# Patient Record
Sex: FEMALE | Race: ASIAN | NOT HISPANIC OR LATINO | Employment: UNEMPLOYED | URBAN - METROPOLITAN AREA
[De-identification: names, ages, dates, MRNs, and addresses within clinical notes are randomized per-mention and may not be internally consistent; named-entity substitution may affect disease eponyms.]

---

## 2020-06-12 ENCOUNTER — APPOINTMENT (OUTPATIENT)
Dept: RADIOLOGY | Facility: MEDICAL CENTER | Age: 60
End: 2020-06-12
Attending: EMERGENCY MEDICINE
Payer: COMMERCIAL

## 2020-06-12 ENCOUNTER — HOSPITAL ENCOUNTER (EMERGENCY)
Facility: MEDICAL CENTER | Age: 60
End: 2020-06-12
Attending: EMERGENCY MEDICINE
Payer: COMMERCIAL

## 2020-06-12 VITALS
SYSTOLIC BLOOD PRESSURE: 108 MMHG | BODY MASS INDEX: 19.41 KG/M2 | HEIGHT: 68 IN | RESPIRATION RATE: 18 BRPM | DIASTOLIC BLOOD PRESSURE: 66 MMHG | TEMPERATURE: 97.9 F | HEART RATE: 70 BPM | WEIGHT: 128.09 LBS | OXYGEN SATURATION: 98 %

## 2020-06-12 DIAGNOSIS — S32.050A CLOSED COMPRESSION FRACTURE OF L5 LUMBAR VERTEBRA, INITIAL ENCOUNTER (HCC): ICD-10-CM

## 2020-06-12 PROCEDURE — 72131 CT LUMBAR SPINE W/O DYE: CPT

## 2020-06-12 PROCEDURE — 99283 EMERGENCY DEPT VISIT LOW MDM: CPT

## 2020-06-12 RX ORDER — IBUPROFEN 200 MG
400 TABLET ORAL EVERY 6 HOURS PRN
Status: SHIPPED | COMMUNITY
End: 2021-11-09

## 2020-06-12 RX ORDER — NAPROXEN SODIUM 220 MG
440 TABLET ORAL 2 TIMES DAILY WITH MEALS
Status: SHIPPED | COMMUNITY
End: 2021-11-09

## 2020-06-12 RX ORDER — HYDROCODONE BITARTRATE AND ACETAMINOPHEN 5; 325 MG/1; MG/1
1 TABLET ORAL EVERY 4 HOURS PRN
Qty: 12 TAB | Refills: 0 | Status: SHIPPED | OUTPATIENT
Start: 2020-06-12 | End: 2020-06-15

## 2020-06-12 NOTE — ED TRIAGE NOTES
Pt to ed c/o glf June 1  Injury at work , seen at [facility]  C/o l-spine pain , tingling to both legs at times

## 2020-06-12 NOTE — LETTER
"  FORM C-4:  EMPLOYEE’S CLAIM FOR COMPENSATION/ REPORT OF INITIAL TREATMENT  EMPLOYEE’S CLAIM - PROVIDE ALL INFORMATION REQUESTED   First Name Elsa Last Name Jamarcus Birthdate 1960  Sex female Claim Number   Home Employee Address [address]                                     Zip  [address] Height  1.727 m (5' 8\") Weight  58.1 kg (128 lb 1.4 oz) N  [id number]   Mailing Employee Address [address]               Zip  [address] Telephone  [phone] (home)  Primary Language Spoken   Insurer  *** Third Party   RUBIN CLAIMS [employer] Employee's Occupation (Job Title) When Injury or Occupational Disease Occurred     Employer's Name  Telephone     Employer Address  City  State  Zip    Date of Injury  6/1/2020       Hour of Injury  9:10 AM Date Employer Notified  6/1/2020 Last Day of Work after Injury or Occupational Disease  6/11/2020 Supervisor to Whom Injury Reported  Liudmila Wise   Address or Location of Accident (if applicable) [[address] [place] [address] [address] loading dock]   What were you doing at the time of accident? 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 Be specific and answer in detail. Use additional sheet if necessary)  Received [employer] delivery at 9AM (68 boxes) Re-loading onto cart stepped backwards and fell backwards 3 steps - concrete (no railing) then to asphalt ground. Back Lumber region hit and tumbled and landed on right side.   If you believe that you have an occupational disease, when did you first have knowledge of the disability and it relationship to your employment? When I fell at work Witnesses to the Accident  none   Nature of Injury or Occupational Disease  Workers' Compensation Part(s) of Body Injured or Affected  Lumbar and/or Sacral Vertebrae (Vertebrae NOC Trunk), Elbow (R), Soft Tissue - Neck    I CERTIFY THAT THE ABOVE IS TRUE AND " CORRECT TO THE BEST OF MY KNOWLEDGE AND THAT I HAVE PROVIDED THIS INFORMATION IN ORDER TO OBTAIN THE B[place]EF[place] OF [place] CHAPTER 617 OF [place]).  I HEREBY AUTHORIZE ANY PHYSICIAN, CHIROPRACTOR, SURGEON, PRACTITIONER, OR OTHER PERSON, ANY HOSPITAL, INCLUDING [facility] OR [facility], ANY MEDICAL SERVICE ORGANIZATION, ANY INSURANCE COMPANY, OR OTHER INSTITUTION OR ORGANIZATION TO RELEASE TO EACH OTHER, ANY MEDICAL OR OTHER INFORMATION, INCLUDING BENEFITS PAID OR PAYABLE, PERTINENT TO THIS INJURY OR DISEASE, EXCEPT INFORMATION RELATIVE TO DIAGNOSIS, TREATMENT AND/OR COUNSELING FOR AIDS, PSYCHOLOGICAL CONDITIONS, ALCOHOL OR CONTROLLED SUBSTANCES, FOR WHICH I MUST GIVE SPECIFIC AUTHORIZATION.  A PHOTOSTAT OF THIS AUTHORIZATION SHALL BE AS VALID AS THE ORIGINAL.  Date                                      Place                                                                             Employee’s Signature   THIS REPORT MUST BE COMPLETED AND MAILED WITHIN 3 WORKING DAYS OF TREATMENT   Place [facility], EMERGENCY DEPT                                                                             Name of Facility [facility]   Date  6/12/2020 Diagnosis  (S32.050A) Closed compression fracture of L5 lumbar vertebra, initial encounter ([facility]) Is there evidence the injured employee was under the influence of alcohol and/or another controlled substance at the time of accident?   Hour  9:49 AM Description of Injury or Disease  Closed compression fracture of L5 lumbar vertebra, initial encounter ([facility])     Treatment     Have you advised the patient to remain off work five days or more?             X-Ray Findings    If Yes   From Date    To Date      From information given by the employee, together with medical evidence, can you directly connect this injury or occupational disease as job  "incurred?   If No, is employee capable of: Full Duty    Modified Duty      Is additional medical care by a physician indicated?   If Modified Duty, Specify any Limitations / Restrictions       Do you know of any previous injury or disease contributing to this condition or occupational disease?      Date 6/12/2020 Print Doctor’s Name Sangita Dinero I certify the employer’s copy of this form was mailed on:   Address [address] [place] [id number] [phone] INSURER’S USE ONLY   Provider’s Tax ID Number   Telephone Dept: [phone]    Doctor’s Signature   Degree        Form C-4 (rev.10/07)                                                                         BRIEF DESCRIPTION OF RIGHTS AND BENEFITS  (Pursuant to [place]RS 616C.050)    Notice of Injury or Occupational Disease (Incident Report Form C-1): If an injury or occupational disease (OD) arises out of and in the course of employment, you must provide written notice to your employer as soon as practicable, but no later than 7 days after the accident or OD. Your employer shall maintain a sufficient supply of the required forms.    Claim for Compensation (Form C-4): If medical treatment is sought, the form C-4 is available at the place of initial treatment. A completed \"Claim for Compensation\" (Form C-4) must be filed within 90 days after an accident or OD. The treating physician or chiropractor must, within 3 working days after treatment, complete and mail to the employer, the employer's insurer and third-party , the Claim for Compensation.    Medical Treatment: If you require medical treatment for your on-the-job injury or OD, you may be required to select a physician or chiropractor from a list provided by your workers’ compensation insurer, if it has contracted with an Organization for Managed Care (MCO) or Preferred Provider Organization (PPO) or providers of health care. If your employer has not entered into a contract with an MCO " or PPO, you may select a physician or chiropractor from the Panel of Physicians and Chiropractors. Any medical costs related to your industrial injury or OD will be paid by your insurer.    Temporary Total Disability (TTD): If your doctor has certified that you are unable to work for a period of at least 5 consecutive days, or 5 cumulative days in a 20-day period, or places restrictions on you that your employer does not accommodate, you may be entitled to TTD compensation.    Temporary Partial Disability (TPD): If the wage you receive upon reemployment is less than the compensation for TTD to which you are entitled, the insurer may be required to pay you TPD compensation to make up the difference. TPD can only be paid for a maximum of 24 months.    Permanent Partial Disability (PPD): When your medical condition is stable and there is an indication of a PPD as a result of your injury or OD, within 30 days, your insurer must arrange for an evaluation by a rating physician or chiropractor to determine the degree of your PPD. The amount of your PPD award depends on the date of injury, the results of the PPD evaluation and your age and wage.    Permanent Total Disability (PTD): If you are medically certified by a treating physician or chiropractor as permanently and totally disabled and have been granted a PTD status by your insurer, you are entitled to receive monthly benefits not to exceed 66 2/3% of your average monthly wage. The amount of your PTD payments is subject to reduction if you previously received a PPD award.    Vocational Rehabilitation Services: You may be eligible for vocational rehabilitation services if you are unable to return to the job due to a permanent physical impairment or permanent restrictions as a result of your injury or occupational disease.    Transportation and Per Sasha Reimbursement: You may be eligible for travel expenses and per sasha associated with medical treatment.    Reopening:  You may be able to reopen your claim if your condition worsens after claim closure.     Appeal Process: If you disagree with a written determination issued by the insurer or the insurer does not respond to your request, you may appeal to the Department of Administration, , by following the instructions contained in your determination letter. You must appeal the determination within 70 days from the date of the determination letter at [address], [address], [place], [place] [address], or [address], [address], [place], [place] [address]. If you disagree with the  decision, you may appeal to the Department of Administration, . You must file your appeal within 30 days from the date of the  decision letter at [address], [address], [place], [place] [address], or [address], [place], [place] [address]. If you disagree with a decision of an , you may file a petition for judicial review with the District Court. You must do so within 30 days of the Appeal Officer’s decision. You may be represented by an  at your own expense or you may contact the [facility] for possible representation.    [place]  for Injured Workers (NAIW): If you disagree with a  decision, you may request that NAIW represent you without charge at an  Hearing. For information regarding denial of benefits, you may contact the [facility] at: [address], [address], [place] [address], [phone], or [address], [place] [address], [phone]    To File a Complaint with the Division: If you wish to file a complaint with the  of the Division of Industrial Relations (DIR),  please contact the Workers’ Compensation Section, [address], [address], [place], [place] [address], telephone (705) [phone], or [address] [address], [facility]  [place], [place] [address], telephone [phone].    For assistance with Workers’ Compensation Issues: You may contact the Office of the Governor Consumer Health Assistance, [address] [place] [address], [address], [place], [place] [address], Toll Free 1-222.797.3616, Web site: http://govMercy Health Tiffin Hospital.[email or web address].[email or web address]., E-mail [email or web address].  D-2 (rev. 06/18)              __________________________________________________________________                                    _________________            Employee Name / Signature                                                                                                                            Date

## 2020-06-12 NOTE — LETTER
"  FORM C-4:  EMPLOYEE’S CLAIM FOR COMPENSATION/ REPORT OF INITIAL TREATMENT  EMPLOYEE’S CLAIM - PROVIDE ALL INFORMATION REQUESTED   First Name Elsa Last Name Jamarcus Birthdate 1960  Sex female Claim Number   Home Employee Address [address]                                     Zip  [address] Height  1.727 m (5' 8\") Weight  58.1 kg (128 lb 1.4 oz) [facility]     Mailing Employee Address [address]               Zip  [address] Telephone  [phone] (home)  Primary Language Spoken  English   Insurer  [employer] Third Party   RUBIN CLAIMS [employer] Employee's Occupation (Job Title) When Injury or Occupational Disease Occurred     Employer's Name [employer] Telephone [phone]    Employer Address [address] [place] [address]   Date of Injury  6/1/2020       Hour of Injury  9:10 AM Date Employer Notified  6/1/2020 Last Day of Work after Injury or Occupational Disease  6/11/2020 Supervisor to Whom Injury Reported  Liudmila Wise   Address or Location of Accident (if applicable) [[address]]   What were you doing at the time of accident? 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 Be specific and answer in detail. Use additional sheet if necessary)  Received [employer] delivery at 9AM (68 boxes) Re-loading onto cart stepped backwards and fell backwards 3 steps - concrete (no railing) then to asphalt ground. Back [place] region hit and tumbled and landed on right side.   If you believe that you have an occupational disease, when did you first have knowledge of the disability and it relationship to your employment? When I fell at work Witnesses to the Accident  none   Nature of Injury or Occupational Disease  Workers' Compensation Part(s) of Body Injured or Affected  Lumbar and/or Sacral " Vertebrae (Vertebrae NOC Trunk), Elbow (R), Soft Tissue - Neck    I CERTIFY THAT THE ABOVE IS TRUE AND CORRECT TO THE BEST OF MY KNOWLEDGE AND THAT I HAVE PROVIDED THIS INFORMATION IN ORDER TO OBTAIN THE B[place]EF[place], [place] CHAPTER 617 OF [place]).  I HEREBY AUTHORIZE ANY PHYSICIAN, CHIROPRACTOR, SURGEON, PRACTITIONER, OR OTHER PERSON, ANY HOSPITAL, INCLUDING [facility] OR [facility], ANY MEDICAL SERVICE ORGANIZATION, ANY INSURANCE COMPANY, OR OTHER INSTITUTION OR ORGANIZATION TO RELEASE TO EACH OTHER, ANY MEDICAL OR OTHER INFORMATION, INCLUDING BENEFITS PAID OR PAYABLE, PERTINENT TO THIS INJURY OR DISEASE, EXCEPT INFORMATION RELATIVE TO DIAGNOSIS, TREATMENT AND/OR COUNSELING FOR AIDS, PSYCHOLOGICAL CONDITIONS, ALCOHOL OR CONTROLLED SUBSTANCES, FOR WHICH I MUST GIVE SPECIFIC AUTHORIZATION.  A PHOTOSTAT OF THIS AUTHORIZATION SHALL BE AS VALID AS THE ORIGINAL.  Date  06/12/2020           Place [facility]                     Employee’s Signature   THIS REPORT MUST BE COMPLETED AND MAILED WITHIN 3 WORKING DAYS OF TREATMENT   Place [facility], EMERGENCY DEPT                                                                             Name of Facility [facility]   Date  6/12/2020 Diagnosis  (S32.050A) Closed compression fracture of L5 lumbar vertebra, initial encounter ([facility]) Is there evidence the injured employee was under the influence of alcohol and/or another controlled substance at the time of accident?   Hour  9:59 AM Description of Injury or Disease  Closed compression fracture of L5 lumbar vertebra, initial encounter ([facility]) Yes   Treatment  CT  Have you advised the patient to remain off work five days or more?         Yes   X-Ray Findings  Positive If Yes   From Date  6/12/2020 To Date  6/26/2020   From information given by the employee,  "together with medical evidence, can you directly connect this injury or occupational disease as job incurred? Yes If No, is employee capable of: Full Duty  No Modified Duty  No   Is additional medical care by a physician indicated? Yes If Modified Duty, Specify any Limitations / Restrictions       Do you know of any previous injury or disease contributing to this condition or occupational disease? No    Date 6/12/2020 Print Doctor’s Name Pat Dinero I certify the employer’s copy of this form was mailed on:   [place] [address] [place] [id number] [phone] INSURER’S USE ONLY   Provider’s Tax ID Number  [id number] Telephone Dept: [phone]    Doctor’s Signature e-PAT Sanchez M.D. Degree  MD      Form C-4 (rev.10/07)                                                                         BRIEF DESCRIPTION OF RIGHTS AND BENEFITS  ([place]ursuant to [place] 616C.050)    Notice of Injury or Occupational Disease (Incident Report Form C-1): If an injury or occupational disease (OD) arises out of and in the course of employment, you must provide written notice to your employer as soon as practicable, but no later than 7 days after the accident or OD. Your employer shall maintain a sufficient supply of the required forms.    Claim for Compensation (Form C-4): If medical treatment is sought, the form C-4 is available at the place of initial treatment. A completed \"Claim for Compensation\" (Form C-4) must be filed within 90 days after an accident or OD. The treating physician or chiropractor must, within 3 working days after treatment, complete and mail to the employer, the employer's insurer and third-party , the Claim for Compensation.    Medical Treatment: If you require medical treatment for your on-the-job injury or OD, you may be required to select a physician or chiropractor from a list provided by your workers’ compensation insurer, if it has contracted with an Organization for " Managed Care (MCO) or Preferred Provider Organization (PPO) or providers of health care. If your employer has not entered into a contract with an MCO or PPO, you may select a physician or chiropractor from the Panel of Physicians and Chiropractors. Any medical costs related to your industrial injury or OD will be paid by your insurer.    Temporary Total Disability (TTD): If your doctor has certified that you are unable to work for a period of at least 5 consecutive days, or 5 cumulative days in a 20-day period, or places restrictions on you that your employer does not accommodate, you may be entitled to TTD compensation.    Temporary Partial Disability (TPD): If the wage you receive upon reemployment is less than the compensation for TTD to which you are entitled, the insurer may be required to pay you TPD compensation to make up the difference. TPD can only be paid for a maximum of 24 months.    Permanent Partial Disability (PPD): When your medical condition is stable and there is an indication of a PPD as a result of your injury or OD, within 30 days, your insurer must arrange for an evaluation by a rating physician or chiropractor to determine the degree of your PPD. The amount of your PPD award depends on the date of injury, the results of the PPD evaluation and your age and wage.    Permanent Total Disability (PTD): If you are medically certified by a treating physician or chiropractor as permanently and totally disabled and have been granted a PTD status by your insurer, you are entitled to receive monthly benefits not to exceed 66 2/3% of your average monthly wage. The amount of your PTD payments is subject to reduction if you previously received a PPD award.    Vocational Rehabilitation Services: You may be eligible for vocational rehabilitation services if you are unable to return to the job due to a permanent physical impairment or permanent restrictions as a result of your injury or occupational  disease.    Transportation and Per Sasha Reimbursement: You may be eligible for travel expenses and per sasha associated with medical treatment.    Reopening: You may be able to reopen your claim if your condition worsens after claim closure.     Appeal Process: If you disagree with a written determination issued by the insurer or the insurer does not respond to your request, you may appeal to the Department of Administration, , by following the instructions contained in your determination letter. You must appeal the determination within 70 days from the date of the determination letter at [address], [address], [place], [place] [address], or [address], [address], [place], [place] [address]. If you disagree with the  decision, you may appeal to the Department of Administration, . You must file your appeal within 30 days from the date of the  decision letter at [address], [address], [place], [place] [address], or [address], [place], [place] [address]. If you disagree with a decision of an , you may file a petition for judicial review with the District Court. You must do so within 30 days of the Appeal Officer’s decision. You may be represented by an  at your own expense or you may contact the [facility] for possible representation.    [place]  for In[place] Workers ([place]IW): If you disagree with a  decision, you may request that NAIW represent you without charge at an  Hearing. For information regarding denial of benefits, you may contact the [facility] at: [address], [address], [place] [address], [phone], or [address] [facility] [address], [place] [address], [phone]    To File a Complaint with the Division: If you wish to file a complaint with the  of the Division of Industrial Relations (DIR),  please contact the  Workers’ Compensation Section, [address], [address], [place], [place] [address], telephone (944) 682-3380, or [address], [address], [place], [place] [address], telephone [phone].    For assistance with Workers’ Compensation Issues: You may contact the Office of the Governor Consumer Health Assistance, [address] [place] [address], [address], [place], [place] [address], Toll Free 1-763.334.7579, Web site: http://govUniversity Hospitals TriPoint Medical Center.[facility].[email or web address]., E-mail [email or web address]  D-2 (rev. 06/18)              __________________________________________________________________                                    ____06/12/2020______            Employee Name / Signature                                                                                                                            Date

## 2020-06-12 NOTE — ED NOTES
D/c pt home, 1 rx given . Pt aware of f/u instructions , aware to return for any changes or concerns. No further questions upon d/c home from ed  Pt given and understands controlled substance use consent form signed  Pt aware to no drive or operate vehicle after receiving or taking medication

## 2020-06-12 NOTE — LETTER
"  FORM C-4:  EMPLOYEE’S CLAIM FOR COMPENSATION/ REPORT OF INITIAL TREATMENT  EMPLOYEE’S CLAIM - PROVIDE ALL INFORMATION REQUESTED   First Name Elsa Last Name Jamarcus Birthdate 1960  Sex female Claim Number   Home Employee Address [address]                                     Zip  [address] Height  1.727 m (5' 8\") Weight  58.1 kg (128 lb 1.4 oz) [employer] [facility]     Mailing Employee Address [address]               Zip  [address] Telephone  [phone] (home)  Primary Language Spoken   Insurer   Third Party   RUBIN CLAIMS [employer] Employee's Occupation (Job Title) When Injury or Occupational Disease Occurred     Employer's Name: [employer] Telephone: [phone]    Employer Address: [address] [place] [address] [address] City: [place] Stat:  [place] Zip:  [address]   Date of Injury  6/1/2020       Hour of Injury  9:10 AM Date Employer Notified  6/1/2020 Last Day of Work after Injury or Occupational Disease  6/11/2020 Supervisor to Whom Injury Reported  Liudmila Wise   Address or Location of Accident (if applicable) [[address] [place] [address] [address] loading dock]   What were you doing at the time of accident? 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 Be specific and answer in detail. Use additional sheet if necessary)  Received [employer] delivery at 9AM (68 boxes) Re-loading onto cart stepped backwards and fell backwards 3 steps - concrete (no railing) then to asphalt ground. Back Lumber region hit and tumbled and landed on right side. MGR & Asst. MGR picked me up from the ground.   If you believe that you have an occupational disease, when did you first have knowledge of the disability and it relationship to your employment? When I fell at work Witnesses to the Accident  My Boss Liudmila Wise & Asst MGRItzel saw me on the ground & picked me up.   Nature of Injury or " Occupational Disease  Workers' Compensation Part(s) of Body Injured or Affected  Lumbar and/or Sacral Vertebrae (Vertebrae NOC Trunk), Elbow (R), Soft Tissue - Neck    I CERTIFY THAT THE ABOVE IS TRUE AND CORRECT TO THE BEST OF MY KNOWLEDGE AND THAT I HAVE PROVIDED THIS INFORMATION IN ORDER TO OBTAIN THE BENEFITS OF [place] IN[place] OR CHAPTER [place]).  I HEREBY AUTHORIZE ANY PHYSICIAN, CHIROPRACTOR, SURGEON, PRACTITIONER, OR OTHER PERSON, ANY HOSPITAL, INCLUDING [facility] OR [facility], ANY MEDICAL SERVICE ORGANIZATION, ANY INSURANCE COMPANY, OR OTHER INSTITUTION OR ORGANIZATION TO RELEASE TO EACH OTHER, ANY MEDICAL OR OTHER INFORMATION, INCLUDING BENEFITS PAID OR PAYABLE, PERTINENT TO THIS INJURY OR DISEASE, EXCEPT INFORMATION RELATIVE TO DIAGNOSIS, TREATMENT AND/OR COUNSELING FOR AIDS, PSYCHOLOGICAL CONDITIONS, ALCOHOL OR CONTROLLED SUBSTANCES, FOR WHICH I MUST GIVE SPECIFIC AUTHORIZATION.  A PHOTOSTAT OF THIS AUTHORIZATION SHALL BE AS VALID AS THE ORIGINAL.  Date: 06/12/2020                                Place: [facility]                Employee’s Signature:   THIS REPORT MUST BE COMPLETED AND MAILED WITHIN 3 WORKING DAYS OF TREATMENT   Place [facility], EMERGENCY DEPT                                                                             Name of Facility [facility]   Date  6/12/2020 Diagnosis  (S32.050A) Closed compression fracture of L5 lumbar vertebra, initial encounter ([facility]) Is there evidence the injured employee was under the influence of alcohol and/or another controlled substance at the time of accident?   Hour  11:50 AM Description of Injury or Disease  Closed compression fracture of L5 lumbar vertebra, initial encounter ([facility]) No   Treatment  CT  Have you advised the patient to remain off work five days or more?         Yes   X-Ray  "Findings  Positive If Yes   From Date  6/12/2020 To Date  6/26/2020   From information given by the employee, together with medical evidence, can you directly connect this injury or occupational disease as job incurred? Yes If No, is employee capable of: Full Duty  No Modified Duty  No   Is additional medical care by a physician indicated? Yes If Modified Duty, Specify any Limitations / Restrictions       Do you know of any previous injury or disease contributing to this condition or occupational disease? No    Date 6/23/2020 Print Doctor’s Name Pat Dinero I certify the employer’s copy of this form was mailed on:   Address [address] [address]  [phone] INSURER’S USE ONLY   Provider’s Tax ID Number [id number] Telephone Dept: [phone]    Doctor’s Signature e-PAT Sanchez M.D. Degree MD      Form C-4 (rev.10/07)                                                                         BRIEF DESCRIPTION OF RIGHTS AND BENEFITS  (Pursuant to [place] 616C.050)    Notice of Injury or Occupational Disease (Incident Report Form C-1): If an injury or occupational disease (OD) arises out of and in the course of employment, you must provide written notice to your employer as soon as practicable, but no later than 7 days after the accident or OD. Your employer shall maintain a sufficient supply of the required forms.    Claim for Compensation (Form C-4): If medical treatment is sought, the form C-4 is available at the place of initial treatment. A completed \"Claim for Compensation\" (Form C-4) must be filed within 90 days after an accident or OD. The treating physician or chiropractor must, within 3 working days after treatment, complete and mail to the employer, the employer's insurer and third-party , the Claim for Compensation.    Medical Treatment: If you require medical treatment for your on-the-job injury or OD, you may be required to select a physician or chiropractor " from a list provided by your workers’ compensation insurer, if it has contracted with an Organization for Managed Care (MCO) or Preferred Provider Organization (PPO) or providers of health care. If your employer has not entered into a contract with an MCO or PPO, you may select a physician or chiropractor from the Panel of Physicians and Chiropractors. Any medical costs related to your industrial injury or OD will be paid by your insurer.    Temporary Total Disability (TTD): If your doctor has certified that you are unable to work for a period of at least 5 consecutive days, or 5 cumulative days in a 20-day period, or places restrictions on you that your employer does not accommodate, you may be entitled to TTD compensation.    Temporary Partial Disability (TPD): If the wage you receive upon reemployment is less than the compensation for TTD to which you are entitled, the insurer may be required to pay you TPD compensation to make up the difference. TPD can only be paid for a maximum of 24 months.    Permanent Partial Disability (PPD): When your medical condition is stable and there is an indication of a PPD as a result of your injury or OD, within 30 days, your insurer must arrange for an evaluation by a rating physician or chiropractor to determine the degree of your PPD. The amount of your PPD award depends on the date of injury, the results of the PPD evaluation and your age and wage.    Permanent Total Disability (PTD): If you are medically certified by a treating physician or chiropractor as permanently and totally disabled and have been granted a PTD status by your insurer, you are entitled to receive monthly benefits not to exceed 66 2/3% of your average monthly wage. The amount of your PTD payments is subject to reduction if you previously received a PPD award.    Vocational Rehabilitation Services: You may be eligible for vocational rehabilitation services if you are unable to return to the job due to a  permanent physical impairment or permanent restrictions as a result of your injury or occupational disease.    Transportation and Per Sasha Reimbursement: You may be eligible for travel expenses and per sasha associated with medical treatment.    Reopening: You may be able to reopen your claim if your condition worsens after claim closure.     Appeal Process: If you disagree with a written determination issued by the insurer or the insurer does not respond to your request, you may appeal to the Department of Administration, , by following the instructions contained in your determination letter. You must appeal the determination within 70 days from the date of the determination letter at [address], [address], [place], [place] [address], or [address], [address], [place] [address]. If you disagree with the  decision, you may appeal to the Department of Administration, . You must file your appeal within 30 days from the date of the  decision letter at [address], [address], [place], [place] [address], or [address] [facility] [address], [place] [address]. If you disagree with a decision of an , you may file a petition for judicial review with the District Court. You must do so within 30 days of the Appeal Officer’s decision. You may be represented by an  at your own expense or you may contact the [facility] for possible representation.    [place]  for Injured Workers (NAIW): If you disagree with a  decision, you may request that NAIW represent you without charge at an  Hearing. For information regarding denial of benefits, you may contact the [facility] at: [address], [address], [place] [address], [phone], or [address], [place] [address], [phone]    To File a Complaint with the Division: If you wish to file a  complaint with the  of the Division of Industrial Relations (DIR),  please contact the Workers’ Compensation Section, [address], [address], [place], [place] [address], telephone [phone], or [address], [place], [place] [address], telephone [phone].    For assistance with Workers’ Compensation Issues: You may contact the Office of the Governor Consumer Health Assistance, [address] [place] [address], [address], [place], [place] [address], Toll Free 1-100.610.3659, Web site: [email or web address]., E-mail [email or web address].  D-2 (rev. 06/18)              __________________________________________________________________                                    _________________            Employee Name / Signature                                                                                                                            Date

## 2020-06-12 NOTE — ED PROVIDER NOTES
"ED Provider Note    CHIEF COMPLAINT  Chief Complaint   Patient presents with   • Low Back Pain       HPI  Elsa Ricketts is a 59 y.o. female who presents to the emergency department with a chief complaint of back pain after a fall at work.  She was initially seen by Bryan.  She was placed on anti-inflammatories and muscle relaxants.  She did have x-rays at that time with no noted fracture.  The patient states that they did not take her seriously or listen to her so she came here for a second opinion.  She states her back feels like it gave out on her.  She is able to ambulate she is had no loss of bowel or bladder function.  She is starting to have some associated neck discomfort.  She has some tingling is going into both of her legs, no numbness weakness or inability to ambulate    REVIEW OF SYSTEMS  Patient denies history of cancer, fever, trauma, numbness, weakness, loss of bowel or bladder function. See HPI for further details. All other systems are negative.     PAST MEDICAL HISTORY    Denies    FAMILY HISTORY  No family history on file.    SOCIAL HISTORY  Social History     Tobacco Use   • Smoking status: Not on file   Substance and Sexual Activity   • Alcohol use: Not on file   • Drug use: Not on file   • Sexual activity: Not on file       SURGICAL HISTORY  patient denies any surgical history    CURRENT MEDICATIONS  Ibuprofen, robaxin  ALLERGIES  denies  PHYSICAL EXAM  VITAL SIGNS: /70   Pulse 69   Temp 36.6 °C (97.9 °F) (Temporal)   Resp 16   Ht 1.727 m (5' 8\")   Wt 58.1 kg (128 lb 1.4 oz)   SpO2 97%   BMI 19.48 kg/m²   Constitutional: Well developed, Well nourished, No acute distress, Non-toxic appearance.   HENT: No facial asymmetry, no midline cervical tenderness.  Cardiovascular: Normal heart rate  Thorax & Lungs:  No respiratory distress  Skin: Warm, Dry, No erythema, No rash.   Back:  no tenderness to palpation, no step-offs, no gross deformity, negative straight leg raise bilaterally, no " fasciculations, no patellar reflex asymmetry, no clonus.  Extremities: Intact distal pulses, No edema, No tenderness, No cyanosis, No clubbing. Healed scar with bony deformity noted right foot  Neurologic: Alert & oriented x 3, Normal motor function, Normal sensory function, No focal deficits noted.         RADIOLOGY/PROCEDURES  CT-LSPINE W/O PLUS RECONS   Final Result      Acute-subacute L1 mild anterosuperior endplate compression fracture with no central canal compromise identified              COURSE & MEDICAL DECISION MAKING  Pertinent Labs & Imaging studies reviewed. (See chart for details)    Differential diagnosis includes musculoskeletal back pain, acute radiculopathy, no fever or dysuria to suggest pyelonephritis,  acute cord compression, there is no unexplained fever and back pain or IV drug use to suggest an epidural abscess, there is no neurologic deficit or decreased bowel and bladder control or saddle anesthesia to suggest cauda equina syndrome.     Patient with complaint of back pain, persistent, CT to ensure no bony abn.  Patient was noted to have a minor compression fracture at 1 of 5%.  She will be referred to [facility] that she does not want to go back to [facility] as well as orthopedics as we do not have spine on-call here at [facility], Dr. Gay is on-call.    Emergent MRI is not currently indicated, pt is in understanding to return if they develop weakness, saddle paresthesias, severe worsening of pain, bowel or bladder incontinence, fever, inability to ambulate or they have any other concerns.    DISPOSITION:  Patient will be discharged home in stable condition.    FOLLOW UP:  [address]  [facility] [id number] [phone]        Abel Gay M.D.  [address] [facility] [address]  [phone]            OUTPATIENT MEDICATIONS:  New Prescriptions    HYDROCODONE-ACETAMINOPHEN (NORCO) 5-325 MG TAB PER TABLET    Take  1 Tab by mouth every four hours as needed for up to 3 days.         In prescribing controlled substances to this patient, I certify that I have obtained and reviewed the medical history of Elsalinda Ricketts. I have also made a good kim effort to obtain applicable records from other providers who have treated the patient and no other records are available at this time.     I have conducted a physical exam and documented it. I have reviewed Ms. Ricketts’s prescription history as maintained by the [place] Prescription Monitoring Program.     I have assessed the patient’s risk for abuse, dependency, and addiction using the validated Opioid Risk Tool available at https://www.mdcalc.com/ajwinh-bvcj-tmcv-ort-narcotic-abuse.     Given the above, I believe the benefits of controlled substance therapy outweigh the risks. The reasons for prescribing controlled substances include non-narcotic, oral analgesic alternatives are contraindicated. Accordingly, I have discussed the risk and benefits, treatment plan, and alternative therapies with the patient.         FINAL IMPRESSION  1.  L1 compression fracture        Electronically signed by: Sangita Dinero M.D., 6/12/2020 8:40 AM

## 2020-06-22 NOTE — DISCHARGE PLANNING
Pt called stating the return to work date is different from what she believes Dr Dinero told her. She has not followed up with Occupational Health and is trying to get in to see Dr Jackson. She states the office said he needs to review the records prior to scheduling an appt.    Pt states she has her AVS and didn't see that she was to follow up with [facility]. She was instructed to follow up with [facility] as they can determine if she is ready to return to work.

## 2020-06-24 ENCOUNTER — OCCUPATIONAL MEDICINE (OUTPATIENT)
Dept: OCCUPATIONAL MEDICINE | Facility: CLINIC | Age: 60
End: 2020-06-24
Payer: COMMERCIAL

## 2020-06-24 VITALS — HEIGHT: 68 IN | BODY MASS INDEX: 19.7 KG/M2 | WEIGHT: 130 LBS | RESPIRATION RATE: 16 BRPM

## 2020-06-24 DIAGNOSIS — M54.17 LUMBOSACRAL RADICULOPATHY: ICD-10-CM

## 2020-06-24 DIAGNOSIS — S32.050D CLOSED COMPRESSION FRACTURE OF L5 LUMBAR VERTEBRA, WITH ROUTINE HEALING, SUBSEQUENT ENCOUNTER: ICD-10-CM

## 2020-06-24 PROCEDURE — 99213 OFFICE O/P EST LOW 20 MIN: CPT | Performed by: NURSE PRACTITIONER

## 2020-06-24 ASSESSMENT — ENCOUNTER SYMPTOMS
CONSTITUTIONAL NEGATIVE: 1
FEVER: 0
CARDIOVASCULAR NEGATIVE: 1
TINGLING: 1
BACK PAIN: 1
RESPIRATORY NEGATIVE: 1
SENSORY CHANGE: 1
WEAKNESS: 1
MYALGIAS: 1
PSYCHIATRIC NEGATIVE: 1

## 2020-06-24 ASSESSMENT — PAIN SCALES - GENERAL: PAINLEVEL: 7=MODERATE-SEVERE PAIN

## 2020-06-24 NOTE — LETTER
"   [facility]  [address] [place] [address],   [address] [address]  Phone:  [phone] - Fax:  [phone]   Occupational Health [facility] Progress Report and Disability Certification  Date of Service: 6/24/2020   No Show:  No  Date / Time of Next Visit:     Claim Information   Patient Name: Elsa Ricketts  Claim Number:     Employer:    [employer] Date of Injury:      Insurer / TPA: [employer]  ID / SSN:     Occupation:   Diagnosis: The encounter diagnosis was Closed compression fracture of L5 lumbar vertebra, with routine healing, subsequent encounter.    Medical Information   Related to Industrial Injury? Yes    Subjective Complaints:  DOI 6/1/20: Putting boxes on a loading cart.    Today feels worse. Pain low back radiates down and up the back to the neck, intermittent numbness that radiates bilaterally, more intermittent on the left, then the right,\"legs feel weak and like mush\". Patient states while at home her back collapsed.  Patient denies bowel and bladder changes. Patient has taken hydrocodone, muscle relaxer, and Naproxen with moderate relief of symptoms. Patient using ice, heat, and topical ointment with mild relief. Patient has not not been back to work since the incident. Plan of care discussed with patient.    Objective Findings: Lumbar: No gross deformity noted.  Severe tenderness to paraspinal musculature L3-S1 and left SI joint.  Moderately decreased flexion or rotation.  Straight leg test, deferred at this visit. Unable to heel/toe walk. Cranial nerves grossly intact.  Achilles and patellar reflexes 3+ bilaterally, slightly diminished.   Sensation intact. Slow gait    CT scan 6/12/20:   ED  Discharged      6/12/2020 (2 hours)  [facility], Emergency Dept  Sangita Dinero M.D.   Last attending • Treatment team  Closed compression fracture of L5 lumbar vertebra, initial encounter ([facility])   Clinical impression  " " Low Back Pain   Chief complaint   ED Provider Notes     Expand All Collapse All      ED Provider Note     CHIEF COMPLAINT    Chief Complaint  Patient presents with  • Low Back Pain       HPI  Elsa Ricketts is a 59 y.o. female who presents to the emergency department with a chief complaint of back pain after a fall at work.  She was initially seen by Bryan.  She was placed on anti-inflammatories and muscle relaxants.  She did have x-rays at that time with no noted fracture.  The patient states that they did not take her seriously or listen to her so she came here for a second opinion.  She states her back feels like it gave out on her.  She is able to ambulate she is had no loss of bowel or bladder function.  She is starting to have some associated neck discomfort.  She has some tingling is going into both of her legs, no numbness weakness or inability to ambulate     REVIEW OF SYSTEMS  Patient denies history of cancer, fever, trauma, numbness, weakness, loss of bowel or bladder function. See HPI for further details. All other systems are negative.      PAST MEDICAL HISTORY    Denies     FAMILY HISTORY    Family History  No family history on file.       SOCIAL HISTORY    Social History       Tobacco Use  • Smoking status: Not on file  Substance and Sexual Activity  • Alcohol use: Not on file  • Drug use: Not on file  • Sexual activity: Not on file       SURGICAL HISTORY  patient denies any surgical history     CURRENT MEDICATIONS  Ibuprofen, robaxin  ALLERGIES  denies  PHYSICAL EXAM  VITAL SIGNS: /70   Pulse 69   Temp 36.6 °C (97.9 °F) (Temporal)   Resp 16   Ht 1.727 m (5' 8\")   Wt 58.1 kg (128 lb 1.4 oz)   SpO2 97%   BMI 19.48 kg/m²   Constitutional: Well developed, Well nourished, No acute distress, Non-toxic appearance.   HENT: No facial asymmetry, no midline cervical tenderness.  Cardiovascular: Normal heart rate  Thorax & Lungs:  No respiratory distress  Skin: Warm, Dry, No erythema, No rash. "   Back:  no tenderness to palpation, no step-offs, no gross deformity, negative straight leg raise bilaterally, no fasciculations, no patellar reflex asymmetry, no clonus.  Extremities: Intact distal pulses, No edema, No tenderness, No cyanosis, No clubbing. Healed scar with bony deformity noted right foot  Neurologic: Alert & oriented x 3, Normal motor function, Normal sensory function, No focal deficits noted.            RADIOLOGY/PROCEDURES    CT-LSPINE W/O PLUS RECONS  Final Result     Acute-subacute L1 mild anterosuperior endplate compression fracture with no central canal compromise identified                COURSE & MEDICAL DECISION MAKING  Pertinent Labs & Imaging studies reviewed. (See chart for details)     Differential diagnosis includes musculoskeletal back pain, acute radiculopathy, no fever or dysuria to suggest pyelonephritis,  acute cord compression, there is no unexplained fever and back pain or IV drug use to suggest an epidural abscess, there is no neurologic deficit or decreased bowel and bladder control or saddle anesthesia to suggest cauda equina syndrome.      Patient with complaint of back pain, persistent, CT to ensure no bony abn.  Patient was noted to have a minor compression fracture at 1 of 5%.  She will be referred to [facility] [employer] To Get Approved For [facility] that she does not want to go back to [facility] as well as orthopedics as we do not have spine on-call here at [facility], Dr. Gay is on-call.     Emergent MRI is not currently indicated, pt is in understanding to return if they develop weakness, saddle paresthesias, severe worsening of pain, bowel or bladder incontinence, fever, inability to ambulate or they have any other concerns.     DISPOSITION:  Patient will be discharged home in stable condition.     FOLLOW UP:  [address] [place] [address] [phone]           Abel Gay M.D.  [address] [facility] [place]  [address]  [phone]                OUTPATIENT MEDICATIONS:    New Prescriptions    HYDROCODONE-ACETAMINOPHEN (NORCO) 5-325 MG TAB PER TABLET    Take 1 Tab by mouth every four hours as needed for up to 3 days.          In prescribing controlled substances to this patient, I certify that I have obtained and reviewed the medical history of Elsa Ricketts. I have also made a good kim effort to obtain applicable records from other providers who have treated the patient and no other records are available at this time.      I have conducted a physical exam and documented it. I have reviewed Ms. Ricketts's prescription history as maintained by the [place] Prescription Monitoring Program.      I have assessed the patient's risk for abuse, dependency, and addiction using the validated Opioid Risk Tool available at https://www.mdcalc.com/stnadq-axrj-xgys-ort-narcotic-abuse.      Given the above, I believe the benefits of controlled substance therapy outweigh the risks. The reasons for prescribing controlled substances include non-narcotic, oral analgesic alternatives are contraindicated. Accordingly, I have discussed the risk and benefits, treatment plan, and alternative therapies with the patient.            FINAL IMPRESSION  1.  L1 compression fracture           Electronically signed by: Sangita Dinero M.D., 6/12/2020 8:40 AM       Other Notes   All notes      ED Notes from Elliot Nance R.N.      ED Notes from Elliot Nance R.N.      ED Notes from Jose G Parikh   Additional Orders and Documentation        Results  Imaging        Meds         Orders         Flowsheets      SmartForms:     EDR[facility]_C-4 PART 2     Encounter Info:    History,   Allergies,   Detailed Report     Communications        Summary of care document sent to Abel Gay M.D.   Sent 6/12/2020 by Sangita Dinero M.D.   Media     Discharge Instruction - Scan on 6/16/2020 8:49 PM   Consent Form - Controlled Substance -  Scan on 6/16/2020 8:49 PM   E-Consent - Pharmacy - Electronic signature on 6/12/2020 - E-signed   Clinical Impressions       Closed compression fracture of L5 lumbar vertebra, initial encounter ([facility])   Disposition        Discharge   Condition: Stable       AVS     1 Safe Discharge (Printed 6/12/2020)   2 Controlled Substance Use Informed Consent (Printed 6/12/2020)   3 ED After Visit Summary (Printed 6/12/2020)       Follow-Ups     [facility]   2 Follow up with Abel Gay M.D. (Orthopaedics)   3   Medication Changes        HYDROcodone-Acetaminophen 5-325 MG 1 Tab Oral EVERY 4 HOURS PRN     Medication List at Discharge    Care Timeline     0805    Arrived   0851    CT-LSPINE W/O PLUS RECONS   1018    Discharged        Pre-Existing Condition(s):     Assessment:   Condition Worsened    Status: Discharged / Care Transfer  Permanent Disability:No    Plan: Transfer [facility]ostics    Diagnostics: MRI    Comments:  Follow-up in 2 weeks, if not seen by Orthopedics  Restricted duty, Orthopedics  MRI ordered  Orthopedic referral placed, transfer care  Work restrictions  Continue with gentle range of motion and stretching as needed   Take muscle relaxer's and Norco   as prescribed DO NOT DRIVE or WORK while taking this medication  Continue rest, ice, heat, and topical ointments as needed     GO TO ER for immediate reevaluation for increased pain, numbness/weakness of lower extremities, or incontinence of bowel o  r bladder.      Disability Information   Status: Released to Restricted Duty    From:  6/24/2020  Through:   Restrictions are: Temporary   Physical Restrictions   Sitting:  < or = to 1 hr/day Standing:    Stooping:    Bending:      Squatting:    Walking:  < or = to 1 hr/day Climbing:    Pushing:  < or = to 1 hr/day   Pulling:  < or = to 1 hr/day Other:    Reaching Above Shoulder (L):   Reaching Above Shoulder (R):       Reaching Below Shoulder  (L):    Reaching Below Shoulder (R):      Not to exceed Weight Limits   Carrying(hrs):   Weight Limit(lb):   Comments:No lifting more than 5lbs Lifting(hrs):   Weight  Limit(lb):   Comments:No lifting more than 5lbs   Comments:      Repetitive Actions   Hands: i.e. Fine Manipulations from Grasping:     Feet: i.e. Operating Foot Controls:     Driving / Operate Machinery:     Physician Name: ELIZA Padilla Physician Signature:   e-Signature: Dr. Chuck Ruiz, Medical Director   Clinic Name / Location: [address],   [address]  [place] [address] Clinic Phone Number: Dept: [phone]   Appointment Time: 10:45 Am Visit Start Time: 10:45 AM   Check-In Time:  10:40 Am Visit Discharge Time: 11: 34 AM    Original-Treating Physician or Chiropractor    Page 2-Insurer/TPA    Page 3-Employer    Page 4-Employee

## 2020-06-24 NOTE — PROGRESS NOTES
"Subjective:      Elsa Ricketts is a 59 y.o. female who presents with Follow-Up ( DOI 06/01/2020 - Back , Neck , Elbow - )      DOI 6/1/20: Putting boxes on a loading cart.    Today feels worse. Pain low back radiates down and up the back to the neck, intermittent numbness that radiates bilaterally, more intermittent on the left, then the right,\"legs feel weak and like mush\". Patient states while at home her back collapsed.  Patient denies bowel and bladder changes. Patient has taken hydrocodone, muscle relaxer, and Naproxen with moderate relief of symptoms. Patient using ice, heat, and topical ointment with mild relief. Patient has not not been back to work since the incident. Plan of care discussed with patient.      HPI    Review of Systems   Constitutional: Negative.  Negative for fever and malaise/fatigue.   Respiratory: Negative.    Cardiovascular: Negative.    Musculoskeletal: Positive for back pain and myalgias.   Skin: Negative.    Neurological: Positive for tingling, sensory change and weakness.   Psychiatric/Behavioral: Negative.      ROS: All systems were reviewed on intake form, form was reviewed and signed. See scanned documents in media. Pertinent positives and negatives included in HPI.    PMH: No pertinent past medical history to this problem  MEDS: Medications were reviewed in Epic  ALLERGIES: No Known Allergies  SOCHX: Works as  at [employer]  FH: No pertinent family history to this problem       Objective:     Resp 16   Ht 1.727 m (5' 8\")   Wt 59 kg (130 lb)   BMI 19.77 kg/m²      Physical Exam  Constitutional:       General: She is in acute distress.      Appearance: Normal appearance. She is not ill-appearing.   Cardiovascular:      Rate and Rhythm: Normal rate and regular rhythm.      Pulses: Normal pulses.   Pulmonary:      Effort: Pulmonary effort is normal.   Musculoskeletal:         General: Tenderness and signs of injury present. No swelling or deformity. "   Skin:     General: Skin is warm and dry.      Capillary Refill: Capillary refill takes less than 2 seconds.      Findings: No bruising or erythema.   Neurological:      General: No focal deficit present.      Mental Status: She is alert and oriented to person, place, and time.      Cranial Nerves: No cranial nerve deficit.      Sensory: Sensory deficit present.      Motor: Weakness present.      Gait: Gait abnormal.      Deep Tendon Reflexes: Reflexes abnormal.   Psychiatric:         Mood and Affect: Mood normal.         Behavior: Behavior normal.         Lumbar: No gross deformity noted.  Severe tenderness to paraspinal musculature L3-S1 and left SI joint.  Moderately decreased flexion or rotation.  Straight leg test, deferred at this visit. Unable to heel/toe walk. Cranial nerves grossly intact.  Achilles and patellar reflexes 3+ bilaterally, slightly diminished.   Sensation intact. Slow gait    CT scan of Lumbar Spine  6/12/20:     IMPRESSION:     Acute-subacute L1 mild anterosuperior endplate compression fracture with no central canal compromise identified       Assessment/Plan:       1. Closed compression fracture of L5 lumbar vertebra, with routine healing, subsequent encounter    - MR-LUMBAR SPINE-W/O; Future  - REFERRAL TO RADIOLOGY  - REFERRAL TO ORTHOPEDICS    Follow-up in 2 weeks, if not seen by Orthopedics   Restricted duty, Orthopedics   MRI ordered   Orthopedic referral placed, transfer care   Work restrictions   Continue with gentle range of motion and stretching as needed   Take muscle relaxer's and Norco as prescribed DO NOT DRIVE or WORK while taking this medication   Continue rest, ice, heat, and topical ointments as needed     GO TO ER for immediate reevaluation for increased pain, numbness/weakness of lower extremities, or incontinence of bowel or bladder.

## 2020-07-06 ENCOUNTER — TELEPHONE (OUTPATIENT)
Dept: OCCUPATIONAL MEDICINE | Facility: CLINIC | Age: 60
End: 2020-07-06

## 2020-07-06 NOTE — TELEPHONE ENCOUNTER
Patient called multiple times demanding to be transferred to provider.  Return patient's call patient states that she was seen by [facility] last Wednesday or Thursday and her MRI is scheduled for tomorrow.  Patient states that she was going to avoid doing the MRI until her neck was added.  And strongly encourage patient not to cancel MRI of lumbar spine due to her symptoms.  Patient appears to be confused and encouraged to contact Workmen's Comp.  for questions regarding her claim.  Patient strongly encouraged to keep all appointments scheduled with [facility] for evaluation and management.  Patient verbalized her understanding.

## 2021-10-08 ENCOUNTER — TELEPHONE (OUTPATIENT)
Dept: SCHEDULING | Facility: IMAGING CENTER | Age: 61
End: 2021-10-08

## 2021-11-09 ENCOUNTER — TELEMEDICINE (OUTPATIENT)
Dept: MEDICAL GROUP | Facility: CLINIC | Age: 61
End: 2021-11-09
Payer: MEDICAID

## 2021-11-09 DIAGNOSIS — Z12.31 SCREENING MAMMOGRAM FOR BREAST CANCER: ICD-10-CM

## 2021-11-09 DIAGNOSIS — Z98.82 H/O BILATERAL BREAST IMPLANTS: ICD-10-CM

## 2021-11-09 DIAGNOSIS — Z80.0 FAMILY HISTORY OF COLON CANCER: ICD-10-CM

## 2021-11-09 DIAGNOSIS — K80.20 CALCULUS OF GALLBLADDER WITHOUT CHOLECYSTITIS WITHOUT OBSTRUCTION: ICD-10-CM

## 2021-11-09 DIAGNOSIS — E78.5 HYPERLIPIDEMIA, UNSPECIFIED HYPERLIPIDEMIA TYPE: ICD-10-CM

## 2021-11-09 DIAGNOSIS — Z12.11 COLON CANCER SCREENING: ICD-10-CM

## 2021-11-09 DIAGNOSIS — D70.9 NEUTROPENIA, UNSPECIFIED TYPE (HCC): ICD-10-CM

## 2021-11-09 PROCEDURE — 99203 OFFICE O/P NEW LOW 30 MIN: CPT | Mod: GT | Performed by: FAMILY MEDICINE

## 2021-11-09 NOTE — PROGRESS NOTES
Pt consented to zoom visit-    Wants orders labs, mammo, colonoscopy.  She had a normal mammogram in 2017, and her last colonoscopy was approximately 6 years ago and normal.  She does have a family history of colon cancer.  I reviewed her labs from 2017, her cholesterol was a little bit elevated but her HDL was significantly elevated.  She seems to have a history of a chronic low-grade neutropenia with normal cell lines red and platelets.  She has had both of her Covid vaccines but has yet to have her flu shot or her shingles shot.    Constitutional: Alert, no distress, well-groomed.  Skin: Warm, dry, good turgor, no rashes in visible areas.  Eye: Equal, round and reactive, conjunctiva clear, lids normal.  ENMT: Lips without lesions, good dentition, moist mucous membranes.  Neck: Trachea midline, no masses, no thyromegaly.  Respiratory: Unlabored respiratory effort, no cough.  MSK: Normal gait, moves all extremities.  Neuro: Grossly non-focal.   Psych: Alert and oriented x3, normal affect and mood.    Assessment and plan    1.  Breast cancer screening with a history of breast implants, we will order a screening mammogram.    2.  Colon cancer screening she has a history of family colon cancer I will go ahead and get her scheduled for a repeat colonoscopy.    3.  Hyperlipidemia, she has not had her cholesterol checked in several years, we will check a panel.  She is not currently on medicine or meet criteria for medicine.    4.  Chronic leukopenia.  Mild.  Other cell lines not affected.  We will check a repeat CBC today to compare to her last one in 2017.    5.  Vaccinations I did recommend that she get a flu shot and a shingles shot.  She has had 3 Covid shots.

## 2021-12-16 ENCOUNTER — TELEPHONE (OUTPATIENT)
Dept: MEDICAL GROUP | Facility: CLINIC | Age: 61
End: 2021-12-16

## 2022-01-11 NOTE — TELEPHONE ENCOUNTER
I called Elsa and she wants to know if you can send a referral ahead of her appt. She has a mole that is getting bigger and rough on the right side of her temple near her hairline, so she wants a referral to dermatology.     She has an appt on 1/18 so she requests we confirm her appt on Friday of this week because she has to travel for her appt.

## 2022-01-14 DIAGNOSIS — D22.9 SUSPICIOUS NEVUS: ICD-10-CM

## 2022-03-31 ENCOUNTER — HOSPITAL ENCOUNTER (OUTPATIENT)
Dept: RADIOLOGY | Facility: MEDICAL CENTER | Age: 62
End: 2022-03-31
Payer: MEDICAID

## 2022-04-06 ENCOUNTER — TELEPHONE (OUTPATIENT)
Dept: MEDICAL GROUP | Facility: CLINIC | Age: 62
End: 2022-04-06
Payer: MEDICAID

## 2022-04-06 NOTE — TELEPHONE ENCOUNTER
Elsa was referred to [facility] in [place] and is wondering if you know any of the doctors there that you could personally recommend so that she can call and request them?     Here is a link to the list of the providers there:  [email or web address]

## 2022-04-08 ENCOUNTER — APPOINTMENT (OUTPATIENT)
Dept: RADIOLOGY | Facility: MEDICAL CENTER | Age: 62
End: 2022-04-08
Attending: FAMILY MEDICINE
Payer: MEDICAID

## 2022-05-16 ENCOUNTER — HOSPITAL ENCOUNTER (OUTPATIENT)
Dept: RADIOLOGY | Facility: MEDICAL CENTER | Age: 62
End: 2022-05-16
Attending: FAMILY MEDICINE
Payer: MEDICAID

## 2022-05-16 DIAGNOSIS — Z12.31 SCREENING MAMMOGRAM FOR BREAST CANCER: ICD-10-CM

## 2022-05-16 DIAGNOSIS — Z98.82 H/O BILATERAL BREAST IMPLANTS: ICD-10-CM

## 2022-05-16 PROCEDURE — 77063 BREAST TOMOSYNTHESIS BI: CPT

## 2022-05-17 ENCOUNTER — OFFICE VISIT (OUTPATIENT)
Dept: MEDICAL GROUP | Facility: CLINIC | Age: 62
End: 2022-05-17
Payer: MEDICAID

## 2022-05-17 VITALS
SYSTOLIC BLOOD PRESSURE: 99 MMHG | HEIGHT: 70 IN | OXYGEN SATURATION: 93 % | HEART RATE: 65 BPM | BODY MASS INDEX: 20.19 KG/M2 | WEIGHT: 141 LBS | DIASTOLIC BLOOD PRESSURE: 66 MMHG

## 2022-05-17 DIAGNOSIS — E78.5 HYPERLIPIDEMIA, UNSPECIFIED HYPERLIPIDEMIA TYPE: ICD-10-CM

## 2022-05-17 DIAGNOSIS — Z80.0 FAMILY HISTORY OF COLON CANCER: ICD-10-CM

## 2022-05-17 DIAGNOSIS — Z12.11 COLON CANCER SCREENING: ICD-10-CM

## 2022-05-17 DIAGNOSIS — I25.10 CORONARY ARTERY DISEASE INVOLVING NATIVE CORONARY ARTERY OF NATIVE HEART WITHOUT ANGINA PECTORIS: ICD-10-CM

## 2022-05-17 DIAGNOSIS — K44.9 HIATAL HERNIA: ICD-10-CM

## 2022-05-17 DIAGNOSIS — Z12.31 SCREENING MAMMOGRAM FOR BREAST CANCER: ICD-10-CM

## 2022-05-17 DIAGNOSIS — K80.20 CALCULUS OF GALLBLADDER WITHOUT CHOLECYSTITIS WITHOUT OBSTRUCTION: ICD-10-CM

## 2022-05-17 DIAGNOSIS — I51.7 CARDIOMEGALY: ICD-10-CM

## 2022-05-17 DIAGNOSIS — K57.90 DIVERTICULOSIS: ICD-10-CM

## 2022-05-17 DIAGNOSIS — K59.00 CONSTIPATION, UNSPECIFIED CONSTIPATION TYPE: ICD-10-CM

## 2022-05-17 PROCEDURE — 99214 OFFICE O/P EST MOD 30 MIN: CPT | Performed by: FAMILY MEDICINE

## 2022-05-17 RX ORDER — ATORVASTATIN CALCIUM 20 MG/1
20 TABLET, FILM COATED ORAL NIGHTLY
Qty: 90 TABLET | Refills: 3 | Status: SHIPPED
Start: 2022-05-17 | End: 2022-06-13

## 2022-05-17 RX ORDER — ASPIRIN 81 MG/1
81 TABLET ORAL DAILY
Qty: 90 TABLET | Refills: 3 | Status: SHIPPED | OUTPATIENT
Start: 2022-05-17 | End: 2022-10-06 | Stop reason: SDUPTHER

## 2022-05-17 SDOH — ECONOMIC STABILITY: FOOD INSECURITY: WITHIN THE PAST 12 MONTHS, THE FOOD YOU BOUGHT JUST DIDN'T LAST AND YOU DIDN'T HAVE MONEY TO GET MORE.: PATIENT DECLINED

## 2022-05-17 SDOH — ECONOMIC STABILITY: TRANSPORTATION INSECURITY
IN THE PAST 12 MONTHS, HAS LACK OF TRANSPORTATION KEPT YOU FROM MEETINGS, WORK, OR FROM GETTING THINGS NEEDED FOR DAILY LIVING?: PATIENT DECLINED

## 2022-05-17 SDOH — ECONOMIC STABILITY: HOUSING INSECURITY
IN THE LAST 12 MONTHS, WAS THERE A TIME WHEN YOU DID NOT HAVE A STEADY PLACE TO SLEEP OR SLEPT IN A SHELTER (INCLUDING NOW)?: PATIENT REFUSED

## 2022-05-17 SDOH — ECONOMIC STABILITY: INCOME INSECURITY: IN THE LAST 12 MONTHS, WAS THERE A TIME WHEN YOU WERE NOT ABLE TO PAY THE MORTGAGE OR RENT ON TIME?: PATIENT REFUSED

## 2022-05-17 SDOH — ECONOMIC STABILITY: TRANSPORTATION INSECURITY
IN THE PAST 12 MONTHS, HAS LACK OF RELIABLE TRANSPORTATION KEPT YOU FROM MEDICAL APPOINTMENTS, MEETINGS, WORK OR FROM GETTING THINGS NEEDED FOR DAILY LIVING?: PATIENT DECLINED

## 2022-05-17 SDOH — ECONOMIC STABILITY: TRANSPORTATION INSECURITY
IN THE PAST 12 MONTHS, HAS THE LACK OF TRANSPORTATION KEPT YOU FROM MEDICAL APPOINTMENTS OR FROM GETTING MEDICATIONS?: PATIENT DECLINED

## 2022-05-17 SDOH — ECONOMIC STABILITY: INCOME INSECURITY: HOW HARD IS IT FOR YOU TO PAY FOR THE VERY BASICS LIKE FOOD, HOUSING, MEDICAL CARE, AND HEATING?: PATIENT DECLINED

## 2022-05-17 SDOH — ECONOMIC STABILITY: FOOD INSECURITY: WITHIN THE PAST 12 MONTHS, YOU WORRIED THAT YOUR FOOD WOULD RUN OUT BEFORE YOU GOT MONEY TO BUY MORE.: SOMETIMES TRUE

## 2022-05-17 SDOH — HEALTH STABILITY: MENTAL HEALTH
STRESS IS WHEN SOMEONE FEELS TENSE, NERVOUS, ANXIOUS, OR CAN'T SLEEP AT NIGHT BECAUSE THEIR MIND IS TROUBLED. HOW STRESSED ARE YOU?: TO SOME EXTENT

## 2022-05-17 SDOH — HEALTH STABILITY: PHYSICAL HEALTH: ON AVERAGE, HOW MANY DAYS PER WEEK DO YOU ENGAGE IN MODERATE TO STRENUOUS EXERCISE (LIKE A BRISK WALK)?: 7 DAYS

## 2022-05-17 SDOH — ECONOMIC STABILITY: HOUSING INSECURITY

## 2022-05-17 SDOH — HEALTH STABILITY: PHYSICAL HEALTH: ON AVERAGE, HOW MANY MINUTES DO YOU ENGAGE IN EXERCISE AT THIS LEVEL?: 60 MIN

## 2022-05-17 ASSESSMENT — SOCIAL DETERMINANTS OF HEALTH (SDOH)
HOW MANY DRINKS CONTAINING ALCOHOL DO YOU HAVE ON A TYPICAL DAY WHEN YOU ARE DRINKING: 1 OR 2
HOW OFTEN DO YOU HAVE A DRINK CONTAINING ALCOHOL: MONTHLY OR LESS
HOW OFTEN DO YOU ATTENT MEETINGS OF THE CLUB OR ORGANIZATION YOU BELONG TO?: NEVER
HOW OFTEN DO YOU ATTENT MEETINGS OF THE CLUB OR ORGANIZATION YOU BELONG TO?: NEVER
HOW OFTEN DO YOU GET TOGETHER WITH FRIENDS OR RELATIVES?: MORE THAN THREE TIMES A WEEK
HOW HARD IS IT FOR YOU TO PAY FOR THE VERY BASICS LIKE FOOD, HOUSING, MEDICAL CARE, AND HEATING?: PATIENT DECLINED
HOW OFTEN DO YOU ATTENT MEETINGS OF THE CLUB OR ORGANIZATION YOU BELONG TO?: NEVER
HOW OFTEN DO YOU ATTENT MEETINGS OF THE CLUB OR ORGANIZATION YOU BELONG TO?: NEVER
HOW OFTEN DO YOU ATTEND CHURCH OR RELIGIOUS SERVICES?: NEVER
IN A TYPICAL WEEK, HOW MANY TIMES DO YOU TALK ON THE PHONE WITH FAMILY, FRIENDS, OR NEIGHBORS?: MORE THAN THREE TIMES A WEEK
HOW OFTEN DO YOU ATTEND CHURCH OR RELIGIOUS SERVICES?: NEVER
DO YOU BELONG TO ANY CLUBS OR ORGANIZATIONS SUCH AS CHURCH GROUPS UNIONS, FRATERNAL OR ATHLETIC GROUPS, OR SCHOOL GROUPS?: NO
IN A TYPICAL WEEK, HOW MANY TIMES DO YOU TALK ON THE PHONE WITH FAMILY, FRIENDS, OR NEIGHBORS?: MORE THAN THREE TIMES A WEEK
DO YOU BELONG TO ANY CLUBS OR ORGANIZATIONS SUCH AS CHURCH GROUPS UNIONS, FRATERNAL OR ATHLETIC GROUPS, OR SCHOOL GROUPS?: NO
HOW OFTEN DO YOU GET TOGETHER WITH FRIENDS OR RELATIVES?: MORE THAN THREE TIMES A WEEK
IN A TYPICAL WEEK, HOW MANY TIMES DO YOU TALK ON THE PHONE WITH FAMILY, FRIENDS, OR NEIGHBORS?: MORE THAN THREE TIMES A WEEK
HOW OFTEN DO YOU HAVE A DRINK CONTAINING ALCOHOL: MONTHLY OR LESS
WITHIN THE PAST 12 MONTHS, YOU WORRIED THAT YOUR FOOD WOULD RUN OUT BEFORE YOU GOT THE MONEY TO BUY MORE: SOMETIMES TRUE
ARE YOU MARRIED, WIDOWED, DIVORCED, SEPARATED, NEVER MARRIED, OR LIVING WITH A PARTNER?: NEVER MARRIED
HOW OFTEN DO YOU HAVE SIX OR MORE DRINKS ON ONE OCCASION: NEVER
HOW OFTEN DO YOU ATTEND CHURCH OR RELIGIOUS SERVICES?: NEVER
HOW OFTEN DO YOU GET TOGETHER WITH FRIENDS OR RELATIVES?: MORE THAN THREE TIMES A WEEK
WITHIN THE PAST 12 MONTHS, YOU WORRIED THAT YOUR FOOD WOULD RUN OUT BEFORE YOU GOT THE MONEY TO BUY MORE: SOMETIMES TRUE
HOW HARD IS IT FOR YOU TO PAY FOR THE VERY BASICS LIKE FOOD, HOUSING, MEDICAL CARE, AND HEATING?: PATIENT DECLINED
DO YOU BELONG TO ANY CLUBS OR ORGANIZATIONS SUCH AS CHURCH GROUPS UNIONS, FRATERNAL OR ATHLETIC GROUPS, OR SCHOOL GROUPS?: NO
HOW OFTEN DO YOU HAVE SIX OR MORE DRINKS ON ONE OCCASION: NEVER
HOW MANY DRINKS CONTAINING ALCOHOL DO YOU HAVE ON A TYPICAL DAY WHEN YOU ARE DRINKING: 1 OR 2
ARE YOU MARRIED, WIDOWED, DIVORCED, SEPARATED, NEVER MARRIED, OR LIVING WITH A PARTNER?: NEVER MARRIED
IN A TYPICAL WEEK, HOW MANY TIMES DO YOU TALK ON THE PHONE WITH FAMILY, FRIENDS, OR NEIGHBORS?: MORE THAN THREE TIMES A WEEK
DO YOU BELONG TO ANY CLUBS OR ORGANIZATIONS SUCH AS CHURCH GROUPS UNIONS, FRATERNAL OR ATHLETIC GROUPS, OR SCHOOL GROUPS?: NO
ARE YOU MARRIED, WIDOWED, DIVORCED, SEPARATED, NEVER MARRIED, OR LIVING WITH A PARTNER?: NEVER MARRIED
HOW OFTEN DO YOU ATTEND CHURCH OR RELIGIOUS SERVICES?: NEVER
ARE YOU MARRIED, WIDOWED, DIVORCED, SEPARATED, NEVER MARRIED, OR LIVING WITH A PARTNER?: NEVER MARRIED
HOW OFTEN DO YOU GET TOGETHER WITH FRIENDS OR RELATIVES?: MORE THAN THREE TIMES A WEEK

## 2022-05-17 ASSESSMENT — LIFESTYLE VARIABLES
SKIP TO QUESTIONS 9-10: 1
HOW OFTEN DO YOU HAVE A DRINK CONTAINING ALCOHOL: MONTHLY OR LESS
HOW MANY STANDARD DRINKS CONTAINING ALCOHOL DO YOU HAVE ON A TYPICAL DAY: 1 OR 2
HOW OFTEN DO YOU HAVE A DRINK CONTAINING ALCOHOL: MONTHLY OR LESS
AUDIT-C TOTAL SCORE: 1
HOW MANY STANDARD DRINKS CONTAINING ALCOHOL DO YOU HAVE ON A TYPICAL DAY: 1 OR 2
SKIP TO QUESTIONS 9-10: 1
AUDIT-C TOTAL SCORE: 1
HOW OFTEN DO YOU HAVE SIX OR MORE DRINKS ON ONE OCCASION: NEVER
HOW OFTEN DO YOU HAVE SIX OR MORE DRINKS ON ONE OCCASION: NEVER

## 2022-05-17 ASSESSMENT — PATIENT HEALTH QUESTIONNAIRE - PHQ9: CLINICAL INTERPRETATION OF PHQ2 SCORE: 0

## 2022-05-17 NOTE — ASSESSMENT & PLAN NOTE
Her father had colon cancer, she gets scoped every 5 years her last one was normal and she is currently due.  We ordered this for her today

## 2022-05-17 NOTE — ASSESSMENT & PLAN NOTE
She had a mammogram yesterday which showed extremely dense breasts.  I have talked about whether she might want to have a whole breast ultrasound

## 2022-05-17 NOTE — ASSESSMENT & PLAN NOTE
As above.  She had an incidental finding of coronary artery calcifications and cardiomegaly on her CAT scan in May 2021 at [facility].  She is pending an echocardiogram, lipid levels atorvastatin and a baby aspirin.  We did an EKG in the office today which showed sinus bradycardia with a heart rate of 56 a little bit of left atrial enlargement and some right ventricular hypertrophy, we will follow-up after her echo.

## 2022-05-17 NOTE — PROGRESS NOTES
"Subjective:   CC:      HPI:   L knee, hx dislocn - 9/21 xray mild-mod OA  Problem   Coronary Artery Disease Involving Native Coronary Artery of Native Heart Without Angina Pectoris   Cardiomegaly   Family History of Colon Cancer   Screening Mammogram for Breast Cancer    Normal 2017     Hyperlipidemia       Current Outpatient Medications Ordered in Epic   Medication Sig Dispense Refill   • atorvastatin (LIPITOR) 20 MG Tab Take 1 Tablet by mouth every evening. 90 Tablet 3   • aspirin 81 MG EC tablet Take 1 Tablet by mouth every day. 90 Tablet 3     No current Epic-ordered facility-administered medications on file.         ROS:  Gen: no fevers/chills  Pulm: no sob, no cough  CV: no chest pain, no palpitations  GI: no nausea/vomiting, no diarrhea        Objective:     Exam:  BP (!) 99/66   Pulse 65   Ht 1.765 m (5' 9.5\")   Wt 64 kg (141 lb)   SpO2 93%   BMI 20.52 kg/m²  Body mass index is 20.52 kg/m².    Gen: Alert and oriented, No apparent distress.  Neck: Neck is supple without lymphadenopathy.  Lungs: Normal effort, CTA bilaterally, no wheezes, rhonchi, or rales  CV: Regular rate and rhythm. No murmurs, rubs, or gallops.  Ext: No edema.      Assessment & Plan:     61 y.o. female with the following -     Problem List Items Addressed This Visit     Family history of colon cancer     Her father had colon cancer, she gets scoped every 5 years her last one was normal and she is currently due.  We ordered this for her today           Relevant Orders    Referral to GI for Colonoscopy    Screening mammogram for breast cancer     She had a mammogram yesterday which showed extremely dense breasts.  I have talked about whether she might want to have a whole breast ultrasound           Colon cancer screening    Relevant Orders    Referral to GI for Colonoscopy    Hyperlipidemia     We do not have recent lipids on her but several years ago she had a very high HDL and a mildly elevated LDL.  We will recheck her numbers but " she does meet criteria to be on a high intensity statin because she had an incidental finding of coronary artery calcifications on her CAT scan in May 2021.  We will start her on atorvastatin 20 and a baby aspirin daily           Relevant Medications    atorvastatin (LIPITOR) 20 MG Tab    Coronary artery disease involving native coronary artery of native heart without angina pectoris     As above.  She had an incidental finding of coronary artery calcifications and cardiomegaly on her CAT scan in May 2021 at [facility].  She is pending an echocardiogram, lipid levels atorvastatin and a baby aspirin.  We did an EKG in the office today which showed sinus bradycardia with a heart rate of 56 a little bit of left atrial enlargement and some right ventricular hypertrophy, we will follow-up after her echo.           Relevant Medications    atorvastatin (LIPITOR) 20 MG Tab    Other Relevant Orders    EC-ECHOCARDIOGRAM COMPLETE W/O CONT    Comp Metabolic Panel    Lipid Profile    EKG - Clinic Performed    Cardiomegaly    Relevant Medications    atorvastatin (LIPITOR) 20 MG Tab    Other Relevant Orders    EC-ECHOCARDIOGRAM COMPLETE W/O CONT    Lipid Profile      Other Visit Diagnoses     BMI 20.0-20.9, adult        Relevant Orders    Comp Metabolic Panel    Diverticulosis        Hiatal hernia        Calculus of gallbladder without cholecystitis without obstruction        Relevant Orders    Lipid Profile    Constipation, unspecified constipation type                    No follow-ups on file.

## 2022-05-17 NOTE — ASSESSMENT & PLAN NOTE
We do not have recent lipids on her but several years ago she had a very high HDL and a mildly elevated LDL.  We will recheck her numbers but she does meet criteria to be on a high intensity statin because she had an incidental finding of coronary artery calcifications on her CAT scan in May 2021.  We will start her on atorvastatin 20 and a baby aspirin daily

## 2022-05-18 ENCOUNTER — TELEPHONE (OUTPATIENT)
Dept: MEDICAL GROUP | Facility: CLINIC | Age: 62
End: 2022-05-18

## 2022-05-18 ENCOUNTER — HOSPITAL ENCOUNTER (OUTPATIENT)
Dept: LAB | Facility: MEDICAL CENTER | Age: 62
End: 2022-05-18
Attending: FAMILY MEDICINE
Payer: MEDICAID

## 2022-05-18 ENCOUNTER — OFFICE VISIT (OUTPATIENT)
Dept: MEDICAL GROUP | Facility: CLINIC | Age: 62
End: 2022-05-18
Payer: MEDICAID

## 2022-05-18 VITALS
BODY MASS INDEX: 19.76 KG/M2 | HEIGHT: 70 IN | TEMPERATURE: 98 F | DIASTOLIC BLOOD PRESSURE: 72 MMHG | OXYGEN SATURATION: 95 % | RESPIRATION RATE: 16 BRPM | WEIGHT: 138 LBS | HEART RATE: 66 BPM | SYSTOLIC BLOOD PRESSURE: 108 MMHG

## 2022-05-18 DIAGNOSIS — M25.562 CHRONIC PAIN OF LEFT KNEE: ICD-10-CM

## 2022-05-18 DIAGNOSIS — K80.20 CALCULUS OF GALLBLADDER WITHOUT CHOLECYSTITIS WITHOUT OBSTRUCTION: ICD-10-CM

## 2022-05-18 DIAGNOSIS — R92.2 DENSE BREAST: ICD-10-CM

## 2022-05-18 DIAGNOSIS — D70.9 NEUTROPENIA, UNSPECIFIED TYPE (HCC): ICD-10-CM

## 2022-05-18 DIAGNOSIS — D22.9 SUSPICIOUS NEVUS: ICD-10-CM

## 2022-05-18 DIAGNOSIS — I25.10 CORONARY ARTERY DISEASE INVOLVING NATIVE CORONARY ARTERY OF NATIVE HEART WITHOUT ANGINA PECTORIS: ICD-10-CM

## 2022-05-18 DIAGNOSIS — E78.5 HYPERLIPIDEMIA, UNSPECIFIED HYPERLIPIDEMIA TYPE: ICD-10-CM

## 2022-05-18 DIAGNOSIS — G89.29 CHRONIC PAIN OF LEFT KNEE: ICD-10-CM

## 2022-05-18 DIAGNOSIS — I51.7 CARDIOMEGALY: ICD-10-CM

## 2022-05-18 DIAGNOSIS — R92.30 DENSE BREAST: ICD-10-CM

## 2022-05-18 LAB
ALBUMIN SERPL BCP-MCNC: 4.6 G/DL (ref 3.2–4.9)
ALBUMIN/GLOB SERPL: 1.8 G/DL
ALP SERPL-CCNC: 52 U/L (ref 30–99)
ALT SERPL-CCNC: 9 U/L (ref 2–50)
ANION GAP SERPL CALC-SCNC: 10 MMOL/L (ref 7–16)
AST SERPL-CCNC: 18 U/L (ref 12–45)
BASOPHILS # BLD AUTO: 1.5 % (ref 0–1.8)
BASOPHILS # BLD: 0.04 K/UL (ref 0–0.12)
BILIRUB SERPL-MCNC: 0.6 MG/DL (ref 0.1–1.5)
BUN SERPL-MCNC: 17 MG/DL (ref 8–22)
CALCIUM SERPL-MCNC: 9.8 MG/DL (ref 8.5–10.5)
CHLORIDE SERPL-SCNC: 107 MMOL/L (ref 96–112)
CHOLEST SERPL-MCNC: 272 MG/DL (ref 100–199)
CO2 SERPL-SCNC: 27 MMOL/L (ref 20–33)
CREAT SERPL-MCNC: 0.84 MG/DL (ref 0.5–1.4)
EOSINOPHIL # BLD AUTO: 0.17 K/UL (ref 0–0.51)
EOSINOPHIL NFR BLD: 6.6 % (ref 0–6.9)
ERYTHROCYTE [DISTWIDTH] IN BLOOD BY AUTOMATED COUNT: 43 FL (ref 35.9–50)
FASTING STATUS PATIENT QL REPORTED: NORMAL
GFR SERPLBLD CREATININE-BSD FMLA CKD-EPI: 78 ML/MIN/1.73 M 2
GLOBULIN SER CALC-MCNC: 2.5 G/DL (ref 1.9–3.5)
GLUCOSE SERPL-MCNC: 93 MG/DL (ref 65–99)
HCT VFR BLD AUTO: 42.9 % (ref 37–47)
HDLC SERPL-MCNC: 111 MG/DL
HGB BLD-MCNC: 14.1 G/DL (ref 12–16)
IMM GRANULOCYTES # BLD AUTO: 0.01 K/UL (ref 0–0.11)
IMM GRANULOCYTES NFR BLD AUTO: 0.4 % (ref 0–0.9)
LDLC SERPL CALC-MCNC: 150 MG/DL
LYMPHOCYTES # BLD AUTO: 1.18 K/UL (ref 1–4.8)
LYMPHOCYTES NFR BLD: 45.6 % (ref 22–41)
MCH RBC QN AUTO: 30.7 PG (ref 27–33)
MCHC RBC AUTO-ENTMCNC: 32.9 G/DL (ref 33.6–35)
MCV RBC AUTO: 93.3 FL (ref 81.4–97.8)
MONOCYTES # BLD AUTO: 0.2 K/UL (ref 0–0.85)
MONOCYTES NFR BLD AUTO: 7.7 % (ref 0–13.4)
NEUTROPHILS # BLD AUTO: 0.99 K/UL (ref 2–7.15)
NEUTROPHILS NFR BLD: 38.2 % (ref 44–72)
NRBC # BLD AUTO: 0 K/UL
NRBC BLD-RTO: 0 /100 WBC
PLATELET # BLD AUTO: 221 K/UL (ref 164–446)
PMV BLD AUTO: 9.6 FL (ref 9–12.9)
POTASSIUM SERPL-SCNC: 4.4 MMOL/L (ref 3.6–5.5)
PROT SERPL-MCNC: 7.1 G/DL (ref 6–8.2)
RBC # BLD AUTO: 4.6 M/UL (ref 4.2–5.4)
SODIUM SERPL-SCNC: 144 MMOL/L (ref 135–145)
TRIGL SERPL-MCNC: 56 MG/DL (ref 0–149)
WBC # BLD AUTO: 2.6 K/UL (ref 4.8–10.8)

## 2022-05-18 PROCEDURE — 99213 OFFICE O/P EST LOW 20 MIN: CPT | Performed by: FAMILY MEDICINE

## 2022-05-18 PROCEDURE — 80061 LIPID PANEL: CPT

## 2022-05-18 PROCEDURE — 85025 COMPLETE CBC W/AUTO DIFF WBC: CPT

## 2022-05-18 PROCEDURE — 36415 COLL VENOUS BLD VENIPUNCTURE: CPT

## 2022-05-18 PROCEDURE — 80053 COMPREHEN METABOLIC PANEL: CPT

## 2022-05-18 NOTE — TELEPHONE ENCOUNTER
Hey I ordered her an ECHO, they scheduled her in August?! Can you see about moving this up?  Thanks!!

## 2022-05-20 DIAGNOSIS — Z91.89 OTHER SPECIFIED PERSONAL RISK FACTORS, NOT ELSEWHERE CLASSIFIED: ICD-10-CM

## 2022-05-20 DIAGNOSIS — I25.10 CORONARY ARTERY DISEASE INVOLVING NATIVE CORONARY ARTERY OF NATIVE HEART WITHOUT ANGINA PECTORIS: ICD-10-CM

## 2022-05-24 PROBLEM — M25.562 CHRONIC PAIN OF LEFT KNEE: Status: ACTIVE | Noted: 2022-05-24

## 2022-05-24 PROBLEM — G89.29 CHRONIC PAIN OF LEFT KNEE: Status: ACTIVE | Noted: 2022-05-24

## 2022-05-24 ASSESSMENT — ENCOUNTER SYMPTOMS
CARDIOVASCULAR NEGATIVE: 1
NEUROLOGICAL NEGATIVE: 1
EYES NEGATIVE: 1
PSYCHIATRIC NEGATIVE: 1
RESPIRATORY NEGATIVE: 1
GASTROINTESTINAL NEGATIVE: 1
CONSTITUTIONAL NEGATIVE: 1

## 2022-05-24 NOTE — PROGRESS NOTES
"Subjective:   CC:   Chief Complaint   Patient presents with   • Knee Pain     Left knee and foot pain/ swelling        HPI: Elsa is a 61 y.o. female who presents today for the following problems:    Problem   Chronic Pain of Left Knee    Ms. Ricketts comes in to see me today for left knee pain.  She is referral from Dr. Baker not my care patient.  She states that she has had a global knee pain for years.  That from time to time it gets swollen and painful to even move.  And then after a couple of days the pain resolves and the swelling goes down.  She denies any damage to the knee or trauma to the knee that she can remember.  She also states that now her knee does click on her from time to time but denies any locking or giving away.  Currently she states that her knee actually feels pretty good today with only minimal swelling no redness and no pain.         Current Outpatient Medications   Medication Sig Dispense Refill   • atorvastatin (LIPITOR) 20 MG Tab Take 1 Tablet by mouth every evening. 90 Tablet 3   • aspirin 81 MG EC tablet Take 1 Tablet by mouth every day. 90 Tablet 3     No current facility-administered medications for this visit.       Social History     Tobacco Use   • Smoking status: Never Smoker   • Smokeless tobacco: Never Used   Vaping Use   • Vaping Use: Never used   Substance Use Topics   • Alcohol use: Yes     Comment: occ   • Drug use: Never       Review of Systems   Constitutional: Negative.    HENT: Negative.    Eyes: Negative.    Respiratory: Negative.    Cardiovascular: Negative.    Gastrointestinal: Negative.    Skin: Negative.    Neurological: Negative.    Psychiatric/Behavioral: Negative.          Objective:     Vitals:    05/18/22 1000   BP: 108/72   Pulse: 66   Resp: 16   Temp: 36.7 °C (98 °F)   TempSrc: Temporal   SpO2: 95%   Weight: 62.6 kg (138 lb)   Height: 1.765 m (5' 9.5\")     Body mass index is 20.09 kg/m².     Physical Exam  Vitals reviewed.   Constitutional:       Appearance: " Normal appearance.   Cardiovascular:      Rate and Rhythm: Normal rate and regular rhythm.      Pulses: Normal pulses.   Pulmonary:      Effort: Pulmonary effort is normal.      Breath sounds: Normal breath sounds.   Musculoskeletal:      Comments: Left Knee  mild effusion noted   No patellar grind or J sign   No patellar facet tenderness   No patellar apprehension sign   No ttp to the patellar tendon   Valgus stress is negative   Varus stress is negative   Lackman's is negative   Anterior drawer is negative   Jointline ttp is negative   McMurrays is negative   Thessaly's is negative      Neurological:      Mental Status: She is alert.          Assessment & Plan:   Chronic pain of left knee  Evaluation today I find that Ms. Ricketts probably has some osteoarthritis.  Was unable to get an x-ray today as our x-ray services were not available.  But she does have one from about a year ago which showed some arthritis.  Given her exam I think that a conservative approach is probably appropriate here.  I do not see any red flags for internal derangement so therefore working to start with some physical therapy and will recommend some topical over-the-counter remedies she can use for pain.  Follow her up in about 4 to 6 weeks.      Followup: No follow-ups on file.    Mario Johnson M.D.    Please note that this dictation was created using voice recognition software. I have made every reasonable attempt to correct obvious errors, but I expect that there are errors of grammar and possibly content that I did not discover before finalizing the note.

## 2022-05-24 NOTE — ASSESSMENT & PLAN NOTE
Evaluation today I find that Ms. Ricketts probably has some osteoarthritis.  Was unable to get an x-ray today as our x-ray services were not available.  But she does have one from about a year ago which showed some arthritis.  Given her exam I think that a conservative approach is probably appropriate here.  I do not see any red flags for internal derangement so therefore working to start with some physical therapy and will recommend some topical over-the-counter remedies she can use for pain.  Follow her up in about 4 to 6 weeks.

## 2022-05-26 DIAGNOSIS — E78.5 HYPERLIPIDEMIA, UNSPECIFIED HYPERLIPIDEMIA TYPE: ICD-10-CM

## 2022-05-26 DIAGNOSIS — I51.7 CARDIOMEGALY: ICD-10-CM

## 2022-05-26 DIAGNOSIS — I25.10 CORONARY ARTERY DISEASE INVOLVING NATIVE CORONARY ARTERY OF NATIVE HEART WITHOUT ANGINA PECTORIS: ICD-10-CM

## 2022-06-07 ENCOUNTER — HOSPITAL ENCOUNTER (OUTPATIENT)
Dept: RADIOLOGY | Facility: MEDICAL CENTER | Age: 62
End: 2022-06-07
Attending: FAMILY MEDICINE
Payer: MEDICAID

## 2022-06-07 ENCOUNTER — HOSPITAL ENCOUNTER (OUTPATIENT)
Dept: RADIOLOGY | Facility: MEDICAL CENTER | Age: 62
End: 2022-06-07
Attending: FAMILY MEDICINE
Payer: COMMERCIAL

## 2022-06-07 ENCOUNTER — HOSPITAL ENCOUNTER (OUTPATIENT)
Dept: CARDIOLOGY | Facility: MEDICAL CENTER | Age: 62
End: 2022-06-07
Attending: FAMILY MEDICINE
Payer: MEDICAID

## 2022-06-07 DIAGNOSIS — R92.2 DENSE BREAST: ICD-10-CM

## 2022-06-07 DIAGNOSIS — I51.7 CARDIOMEGALY: ICD-10-CM

## 2022-06-07 DIAGNOSIS — Z91.89 OTHER SPECIFIED PERSONAL RISK FACTORS, NOT ELSEWHERE CLASSIFIED: ICD-10-CM

## 2022-06-07 DIAGNOSIS — R92.30 DENSE BREAST: ICD-10-CM

## 2022-06-07 DIAGNOSIS — I25.10 CORONARY ARTERY DISEASE INVOLVING NATIVE CORONARY ARTERY OF NATIVE HEART WITHOUT ANGINA PECTORIS: ICD-10-CM

## 2022-06-07 LAB
LV EJECT FRACT  99904: 65
LV EJECT FRACT MOD 2C 99903: 65.37
LV EJECT FRACT MOD 4C 99902: 71.38
LV EJECT FRACT MOD BP 99901: 68.69

## 2022-06-07 PROCEDURE — 93306 TTE W/DOPPLER COMPLETE: CPT | Mod: 26 | Performed by: INTERNAL MEDICINE

## 2022-06-07 PROCEDURE — 4410556 CT-CARDIAC SCORING (SELF PAY ONLY)

## 2022-06-07 PROCEDURE — 93306 TTE W/DOPPLER COMPLETE: CPT

## 2022-06-07 PROCEDURE — 76641 ULTRASOUND BREAST COMPLETE: CPT

## 2022-06-09 ENCOUNTER — PHYSICAL THERAPY (OUTPATIENT)
Dept: PHYSICAL THERAPY | Facility: MEDICAL CENTER | Age: 62
End: 2022-06-09
Attending: FAMILY MEDICINE
Payer: MEDICAID

## 2022-06-09 ENCOUNTER — PHARMACY VISIT (OUTPATIENT)
Dept: PHARMACY | Facility: MEDICAL CENTER | Age: 62
End: 2022-06-09
Payer: COMMERCIAL

## 2022-06-09 DIAGNOSIS — M25.562 CHRONIC PAIN OF LEFT KNEE: ICD-10-CM

## 2022-06-09 DIAGNOSIS — G89.29 CHRONIC PAIN OF LEFT KNEE: ICD-10-CM

## 2022-06-09 PROCEDURE — RXMED WILLOW AMBULATORY MEDICATION CHARGE: Performed by: INTERNAL MEDICINE

## 2022-06-09 PROCEDURE — 97162 PT EVAL MOD COMPLEX 30 MIN: CPT

## 2022-06-09 RX ORDER — BNT162B2 0.23 MG/2.25ML
0.3 INJECTION, SUSPENSION INTRAMUSCULAR
Qty: 0.3 ML | Refills: 0 | Status: SHIPPED | OUTPATIENT
Start: 2022-06-09 | End: 2022-11-09

## 2022-06-09 ASSESSMENT — ENCOUNTER SYMPTOMS
QUALITY: RADIATING
PAIN SCALE: 5
QUALITY: DISCOMFORT
ALLEVIATING FACTOR: ACTIVITY MODIFICATION
QUALITY: ACHING
PAIN TIMING: WHEN ACTIVE
QUALITY: PRESSURE
QUALITY: STRETCHING SENSATION
QUALITY: SHARP

## 2022-06-09 ASSESSMENT — ACTIVITIES OF DAILY LIVING (ADL): POOR_BALANCE: 1

## 2022-06-09 NOTE — OP THERAPY EVALUATION
"  Outpatient Physical Therapy  INITIAL EVALUATION    [facility]  [address]  [place] [address]  Phone:  [phone]  Fax:  [phone]    Date of Evaluation: 2022    Patient: Elsa Ricketts  YOB: 1960  MRN: [id number]     Referring Provider: Mario Johnson M.D.  [facility] Counts include 234 beds at the [facility]  [place] [address]   Referring Diagnosis Chronic pain of left knee [M25.562, G89.29]     Time Calculation  Start time: 0747  Stop time: 0815 Time Calculation (min): 28 minutes         Chief Complaint: Knee Problem    Visit Diagnoses     ICD-10-CM   1. Chronic pain of left knee  M25.562    G89.29       Date of onset of impairment: 2022    Subjective   History of Present Illness:     History of chief complaint:  Elsa Ricketts come in for eval for R foot pain and persistent L knee pain.  She states that she has had a global knee pain for years. She reports that several years ago she was playing rac[place]places and she hyperextended her knee, she reports it was out of place and she had to \"put it back into place\". It is swollen today. She also reports that 20 years ago she had a Lisfranc injury and procedure to rebuild her R mid foot and ankle approx 20 years ago.  She is active hiking, playing pickle ball etc. She is wanting to know if she can do anything to help her from experiencing pain with activity.     Pain:     Current pain ratin    Quality:  Aching, discomfort, pressure, sharp, radiating and stretching sensation    Pain timing:  When active    Aggravating factors:  Long hike 5+ miles    Post pickle ball    Relieving factors:  Activity modification      Progression:  Worsening    Activity Tolerance:     Current activity tolerance / Recreational activities:  Active with dog and recreational tasks     Social Support:     Lives in:  Multiple-level home    Hand Dominance:     Hand dominance:  Right    Diagnostic " "Tests:     X-ray: normal      There is a small well-corticated calcific/ossific density adjacent to the medial femoral condyle most likely chronic in nature. The visualized osseous structures otherwise appear intact without evidence of acute fracture or dislocation. There   is mild to moderate joint space narrowing and osteophyte formation at all 3 compartments again noted, especially at the patellofemoral compartment. There is a probable mild joint effusion.      No past medical history on file.  No past surgical history on file.    Precautions:       Objective   Observation and functional movement:  Walks with short stance on R foot, fairly normal stride and gait. Some hesitancy with squat and single leg during testing. R foot, arch is bulky and mid foot fused, feet with hammer toes. L knee swollen compared to R.     Range of motion and strength:    L knee missing 110 flexion  5 deg ext/ R knee 135/0 deg     L knee  Mid pat: 37cm   5 cm up: 37 cm  5cm down: 36 cm    Mid pat  Mid patella: 35cm  5 cm up: 37cm  5 cm down: 35 cm    Ankle/knee to wall  L foot 4\"/ R foot 2.5\"     Sensation and reflexes:     Sensation is intact.      Reflexes are normal and symmetrical.    Palpation and joint mobility:     L knee patellar tendon sensitivity    Joint mobility less in L knee     Special tests:      ACL/PCL: neg but lax  Meniscus grind: some sensitivity  Patellar compression: pos    Balance:     Hard to balance on R foot for more than a few sec    L foot 10 sec     No reported falls     Basic self care and IADL's:     Independent with all self care.    Independent with all ADL's.    Cognition and visual perception:     No cognition deficits noted.    No visual perception deficits noted.            Therapeutic Exercises (CPT 66231):     1. Develop HEP       Therapeutic Exercise Summary: Access Code: [email or web address]  URL: [email or web address]  Date: 06/09/2022  Prepared by: Yao Altamirano    Exercises  Seated Plantar Fascia " Mobilization with Small Ball - 1 x daily - 4 x weekly - 1 sets - 2-3 min hold  Big toe mobilization - 1 x daily - 4 x weekly - 5 sets - 3-5 min hold  Heel Raise - 1 x daily - 4 x weekly - 10 reps - 1 sets  Seated Great Toe Extension - 1 x daily - 4 x weekly - 10 reps - 1 sets  Toe Spreading - 1 x daily - 4 x weekly - 10 reps - 1 sets  Gastroc Stretch on Wall - 1 x daily - 4 x weekly - 3 sets - 15-20 sec hold  Squat with TRX® - 1 x daily - 4 x weekly - 2 sets - 5-7 reps  Single Leg Stance - 1 x daily - 4 x weekly - 3 sets - 30 sec hold  Standing Hamstring Stretch on Chair - 1 x daily - 4 x weekly - 3 sets - 30 sec hold  Quadricep Stretch with Chair and Counter Support - 1 x daily - 4 x weekly - 3 sets - 30 sec hold        Time-based treatments/modalities:           Assessment, Response and Plan:   Impairments: abnormal gait, abnormal muscle tone, abnormal or restricted ROM, activity intolerance, impaired functional mobility, impaired balance, lacks appropriate home exercise program, pain with function, swelling and weight-bearing intolerance    Assessment details:  Elsa has several issues. She has 20 year old traumatic injury to R foot which required reconstruction. She has limited balance and ankle range with this. She also report hyperextension of the L knee. She has had x ray but no soft tissue imaging to confirm possible meniscus injury. She is active and will hike and play pickle ball but does not have the strength base to support this. This leads to likely patellar tendinopathy issues and increased moments of swelling and irritation. She would benefit from therapy services to help establish exercise and graded exposure to slow tempo strength work. She also may need imaging to rule out meniscus or internal derangement if she continues to swell and lock up occasionally.  Barriers to therapy:  Comorbidities and financial  Prognosis: fair    Goals:   Short Term Goals:   1. Develop HEP   2. Patient to show  improved strength to show good eccentric to sit to standard chair 3/5 trials  3. Patient with improved hip strength to stand on 1 leg for 10+ on R leg 3/5 trials  4. Patient to report ability to tolerate HEP 3+ x  Week without increased pain    Short term goal time span:  2-4 weeks      Long Term Goals:    1. Update and progress HEP  2. WOMAC score to improve 10 points   3. Patient able to report 50% decreased knee pain via subjective report/objective pain scale post hike or recreational activities.   Long term goal time span:  6-8 weeks    Plan:   Therapy options:  Physical therapy treatment to continue  Planned therapy interventions:  E Stim Unattended (CPT 21464), Neuromuscular Re-education (CPT 85260) and Therapeutic Exercise (CPT 89138)  Frequency:  1x week  Duration in weeks:  8  Duration in visits:  8  Plan details:  8 visits per script    8 units of therapeutic exercise (73129)  8 Units of Neuromuscular Re education (01345)  8 units of Estim (95525)      Functional Assessment Used  WOMAC Grand Total: 17.71     Referring provider co-signature:  I have reviewed this plan of care and my co-signature certifies the need for services.    Certification Period: 06/09/2022 to  08/04/22    Physician Signature: ________________________________ Date: ______________

## 2022-06-13 DIAGNOSIS — I25.10 CORONARY ARTERY DISEASE INVOLVING NATIVE CORONARY ARTERY OF NATIVE HEART WITHOUT ANGINA PECTORIS: ICD-10-CM

## 2022-06-13 RX ORDER — ATORVASTATIN CALCIUM 40 MG/1
40 TABLET, FILM COATED ORAL NIGHTLY
Qty: 90 TABLET | Refills: 3 | Status: SHIPPED | OUTPATIENT
Start: 2022-06-13 | End: 2022-09-07 | Stop reason: SDUPTHER

## 2022-06-20 DIAGNOSIS — E78.2 MIXED HYPERLIPIDEMIA: ICD-10-CM

## 2022-06-20 DIAGNOSIS — I25.10 CORONARY ARTERY DISEASE INVOLVING NATIVE CORONARY ARTERY OF NATIVE HEART WITHOUT ANGINA PECTORIS: ICD-10-CM

## 2022-06-20 DIAGNOSIS — F41.9 ANXIETY: ICD-10-CM

## 2022-06-20 RX ORDER — CITALOPRAM HYDROBROMIDE 10 MG/1
10 TABLET ORAL DAILY
Qty: 90 TABLET | Refills: 3 | Status: SHIPPED
Start: 2022-06-20 | End: 2022-11-09

## 2022-06-21 ENCOUNTER — APPOINTMENT (OUTPATIENT)
Dept: MEDICAL GROUP | Facility: OTHER | Age: 62
End: 2022-06-21
Payer: MEDICAID

## 2022-07-30 ENCOUNTER — TELEPHONE (OUTPATIENT)
Dept: PHYSICAL THERAPY | Facility: MEDICAL CENTER | Age: 62
End: 2022-07-30
Payer: MEDICAID

## 2022-07-31 NOTE — OP THERAPY DISCHARGE SUMMARY
Outpatient Physical Therapy  DISCHARGE SUMMARY NOTE      [facility] Outpatient Physical Therapy  [address]  [place] [address]  Phone:  [phone]  Fax:  [phone]    Date of Visit: 07/30/2022    Patient: Elsa Ricketts  YOB: 1960  MRN: [id number]     Referring Provider: No referring provider defined for this encounter.   Referring Diagnosis No admission diagnoses are documented for this encounter.         Functional Assessment Used        Your patient is being discharged from Physical Therapy with the following comments:   · Goals not met  · Patient has failed to schedule or reschedule follow-up visits   · Unable to get insurance auth and have follow up in allotted time visit.     Comments:  Patient was evaluated on 6/9/22 for pain in left knee. We had some delay in care getting insurance auth. Patient also cancelled visits. And other issues prevented sched of care. We have not had a follow up visit in nearly 7 weeks.  Her evaluating therapist is leaving [facility]. At this point if she needs continued care she will need to follow up with primary care and obtain a new referral     Limitations Remaining:  Unknown    Recommendations:  Stay active with HEP. Follow up with primary care as needed    Yao Altamirano, PT, DPT    Date: 7/30/2022

## 2022-08-01 DIAGNOSIS — Z80.0 FAMILY HISTORY OF COLON CANCER: ICD-10-CM

## 2022-08-01 DIAGNOSIS — I25.10 CORONARY ARTERY DISEASE INVOLVING NATIVE CORONARY ARTERY OF NATIVE HEART WITHOUT ANGINA PECTORIS: ICD-10-CM

## 2022-08-01 DIAGNOSIS — E78.2 MIXED HYPERLIPIDEMIA: ICD-10-CM

## 2022-08-01 DIAGNOSIS — D70.9 NEUTROPENIA, UNSPECIFIED TYPE (HCC): ICD-10-CM

## 2022-08-02 DIAGNOSIS — Z13.79 GENETIC SCREENING: ICD-10-CM

## 2022-08-03 ENCOUNTER — APPOINTMENT (OUTPATIENT)
Dept: PHYSICAL THERAPY | Facility: MEDICAL CENTER | Age: 62
End: 2022-08-03
Attending: FAMILY MEDICINE
Payer: MEDICAID

## 2022-08-03 DIAGNOSIS — M25.562 CHRONIC PAIN OF LEFT KNEE: ICD-10-CM

## 2022-08-03 DIAGNOSIS — G89.29 CHRONIC PAIN OF LEFT KNEE: ICD-10-CM

## 2022-08-12 ENCOUNTER — TELEPHONE (OUTPATIENT)
Dept: CARDIOLOGY | Facility: MEDICAL CENTER | Age: 62
End: 2022-08-12
Payer: MEDICAID

## 2022-08-12 NOTE — TELEPHONE ENCOUNTER
Chart Prep    S/W patient in regards to requesting outside records for NP appt with Dr. Sol. Patient has not followed-up with a cardiologist in the past, no cardiac testing/imaging done outside of [facility] and Labs are most recent in Epic. Patient verbally confirmed time/date/location of appt.

## 2022-08-12 NOTE — PROGRESS NOTES
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 In going thru outside records she has had 4 or 5 CBCs since 2010, all of which show WBC's between 2 and 3K. H/H and platelets are normal. Is being worked up for an enlarged heart. Has knee issues but no other arthritic sx's.     ROS:    Constitutional: No weight loss  Skin: No rash or jaundice  HENT: No change in eyesight or hearing  Cardiovascular:No chest pain or arrythmia  Respiratory:No cough or SOB  GI:No nausea, vomiting, diarrhea, constipation  :No dysuria or frequency  Musculoskeletal:No bone or joint pain  Neuro:No sx's of neuropathy  Psych: No complaints    PMH:      Allergies   Allergen Reactions    Niacin Hives     hives       No past medical history on file.     No past surgical history on file.     Medications:    Current Outpatient Medications on File Prior to Encounter   Medication Sig Dispense Refill    Magnesium 100 MG Cap Take  by mouth every day.      Zinc Sulfate (ZINC 15 PO) Take  by mouth every day.      Cholecalciferol (VITAMIN D) 125 MCG (5000 UT) Cap Take  by mouth 2 times a day.      Misc Natural Products (GLUCOSAMINE CHOND CMP ADVANCED PO) Take  by mouth every day.      Ascorbic Acid (VITAMIN C) 1000 MG Tab Take  by mouth every day.      Omega-3 Fatty Acids (FISH OIL) 1000 MG Cap capsule Take 1,000 mg by mouth 3 times a day with meals.      atorvastatin (LIPITOR) 40 MG Tab Take 1 Tablet by mouth every evening. 90 Tablet 3    aspirin 81 MG EC tablet Take 1 Tablet by mouth every day. 90 Tablet 3    famotidine (PEPCID) 20 MG Tab Take 20 mg by mouth. (Patient not taking: No sig reported)      hydrOXYzine HCl (ATARAX) 25 MG Tab Take 25 mg by mouth. (Patient not taking: No sig reported)      citalopram (CELEXA) 10 MG tablet Take 1 Tablet by mouth every day. (Patient not taking: Reported on 8/16/2022) 90 Tablet 3    COVID-19 mRNA  "Vac-Jackie,Pfizer, (PFIZER-BIONT COVID-19 VAC-JACKIE) 30 MCG/0.3ML Suspension injection Inject 0.3 mL into the shoulder, thigh, or buttocks. (Patient not taking: No sig reported) 0.3 mL 0     No current facility-administered medications on file prior to encounter.       Social History     Tobacco Use    Smoking status: Never    Smokeless tobacco: Never   Substance Use Topics    Alcohol use: Yes     Comment: occ        Family History   Problem Relation Age of Onset    Lung Disease Mother     Colorectal Cancer Father         Objective    Vitals:    /72 (BP Location: Left arm, Patient Position: Sitting, BP Cuff Size: Adult)   Pulse 72   Temp 36.6 °C (97.9 °F) (Temporal)   Resp 16   Ht 1.765 m (5' 9.49\")   Wt 62 kg (136 lb 9.2 oz)   SpO2 93%   BMI 19.89 kg/m²     Physical Exam:    Appears well-developed and well-nourished. No distress.    Head -  Normocephalic .   Eyes - Pupils are equal. Conjunctivae normal. No scleral icterus.   Ears - normal hearing  Mouth - Throat: Oropharynx is clear and moist. No oropharyngeal exudate  Neck - Neck supple. No thyromegaly  Cardiovascular - Normal rate, regular rhythm, normal heart sounds and intact distal pulses. No  gallop, murmur or rub  Pulmonary - Normal breath sounds.  No wheeze, rales or rhonchi  Breast - Not examined  Abdominal -Soft. No distension, tenderness, organomegaly or mass  Extremities-  No edema or tenderness.    Nodes - No submental, submandibular, preauricular, cervical, axillary or inguinal adenopathy.    Neurological -   Alert and oriented.  Skin - Skin is warm and dry. No rash noted. Not diaphoretic. No erythema. No pallor. No jaundice   Psychiatric -  Normal mood and affect.    Labs:     Latest Reference Range & Units 5/18/22 09:36 8/16/22 08:48   WBC 4.8 - 10.8 K/uL 2.6 (L) 2.7 (L)   RBC 4.20 - 5.40 M/uL 4.60 4.45   Hemoglobin 12.0 - 16.0 g/dL 14.1 13.4   Hematocrit 37.0 - 47.0 % 42.9 41.5   MCV 81.4 - 97.8 fL 93.3 93.3   MCH 27.0 - 33.0 pg 30.7 30.1 "   MCHC 33.6 - 35.0 g/dL 32.9 (L) 32.3 (L)   RDW 35.9 - 50.0 fL 43.0 44.8   Platelet Count 164 - 446 K/uL 221 216   MPV 9.0 - 12.9 fL 9.6 10.1   Neutrophils-Polys 44.00 - 72.00 % 38.20 (L) 35.20 (L)   Neutrophils (Absolute) 2.00 - 7.15 K/uL 0.99 (L) 0.94 (L)   Lymphocytes 22.00 - 41.00 % 45.60 (H) 44.60 (H)       Assessment  Most likely her normal given at least 12 year history of neutropenia    Imp:    Visit Diagnosis:    1. Neutropenia, unspecified type (HCC)  VITAMIN B12    FOLATE    ROSE REFLEXIVE PROFILE    CANCELED: US-SPLEEN        Plan:  Above lab. If all normal no need to see her again. If any abnormal will deal with it.    Arsalan Ruiz M.D.

## 2022-08-15 ENCOUNTER — APPOINTMENT (OUTPATIENT)
Dept: LAB | Facility: MEDICAL CENTER | Age: 62
End: 2022-08-15
Attending: FAMILY MEDICINE
Payer: MEDICAID

## 2022-08-16 ENCOUNTER — OFFICE VISIT (OUTPATIENT)
Dept: CARDIOLOGY | Facility: MEDICAL CENTER | Age: 62
End: 2022-08-16
Payer: MEDICAID

## 2022-08-16 ENCOUNTER — RESEARCH ENCOUNTER (OUTPATIENT)
Dept: MEDICAL GROUP | Facility: PHYSICIAN GROUP | Age: 62
End: 2022-08-16
Payer: MEDICAID

## 2022-08-16 ENCOUNTER — HOSPITAL ENCOUNTER (OUTPATIENT)
Dept: LAB | Facility: MEDICAL CENTER | Age: 62
End: 2022-08-16
Attending: FAMILY MEDICINE
Payer: MEDICAID

## 2022-08-16 VITALS
HEART RATE: 71 BPM | SYSTOLIC BLOOD PRESSURE: 108 MMHG | DIASTOLIC BLOOD PRESSURE: 62 MMHG | RESPIRATION RATE: 18 BRPM | OXYGEN SATURATION: 99 % | WEIGHT: 137 LBS | BODY MASS INDEX: 19.61 KG/M2 | HEIGHT: 70 IN

## 2022-08-16 DIAGNOSIS — I51.7 CARDIOMEGALY: ICD-10-CM

## 2022-08-16 DIAGNOSIS — I25.10 CORONARY ARTERY DISEASE INVOLVING NATIVE CORONARY ARTERY OF NATIVE HEART WITHOUT ANGINA PECTORIS: ICD-10-CM

## 2022-08-16 DIAGNOSIS — E78.5 HYPERLIPIDEMIA, UNSPECIFIED HYPERLIPIDEMIA TYPE: ICD-10-CM

## 2022-08-16 DIAGNOSIS — E78.2 MIXED HYPERLIPIDEMIA: ICD-10-CM

## 2022-08-16 DIAGNOSIS — Z98.82 H/O BILATERAL BREAST IMPLANTS: ICD-10-CM

## 2022-08-16 DIAGNOSIS — Z00.6 RESEARCH STUDY PATIENT: ICD-10-CM

## 2022-08-16 DIAGNOSIS — D70.9 NEUTROPENIA, UNSPECIFIED TYPE (HCC): ICD-10-CM

## 2022-08-16 LAB
BASOPHILS # BLD AUTO: 2.2 % (ref 0–1.8)
BASOPHILS # BLD: 0.06 K/UL (ref 0–0.12)
CHOLEST SERPL-MCNC: 174 MG/DL (ref 100–199)
EOSINOPHIL # BLD AUTO: 0.28 K/UL (ref 0–0.51)
EOSINOPHIL NFR BLD: 10.5 % (ref 0–6.9)
ERYTHROCYTE [DISTWIDTH] IN BLOOD BY AUTOMATED COUNT: 44.8 FL (ref 35.9–50)
FASTING STATUS PATIENT QL REPORTED: NORMAL
HCT VFR BLD AUTO: 41.5 % (ref 37–47)
HDLC SERPL-MCNC: 101 MG/DL
HGB BLD-MCNC: 13.4 G/DL (ref 12–16)
IMM GRANULOCYTES # BLD AUTO: 0 K/UL (ref 0–0.11)
IMM GRANULOCYTES NFR BLD AUTO: 0 % (ref 0–0.9)
LDLC SERPL CALC-MCNC: 65 MG/DL
LYMPHOCYTES # BLD AUTO: 1.19 K/UL (ref 1–4.8)
LYMPHOCYTES NFR BLD: 44.6 % (ref 22–41)
MCH RBC QN AUTO: 30.1 PG (ref 27–33)
MCHC RBC AUTO-ENTMCNC: 32.3 G/DL (ref 33.6–35)
MCV RBC AUTO: 93.3 FL (ref 81.4–97.8)
MONOCYTES # BLD AUTO: 0.2 K/UL (ref 0–0.85)
MONOCYTES NFR BLD AUTO: 7.5 % (ref 0–13.4)
NEUTROPHILS # BLD AUTO: 0.94 K/UL (ref 2–7.15)
NEUTROPHILS NFR BLD: 35.2 % (ref 44–72)
NRBC # BLD AUTO: 0 K/UL
NRBC BLD-RTO: 0 /100 WBC
PLATELET # BLD AUTO: 216 K/UL (ref 164–446)
PMV BLD AUTO: 10.1 FL (ref 9–12.9)
RBC # BLD AUTO: 4.45 M/UL (ref 4.2–5.4)
TRIGL SERPL-MCNC: 40 MG/DL (ref 0–149)
WBC # BLD AUTO: 2.7 K/UL (ref 4.8–10.8)

## 2022-08-16 PROCEDURE — 36415 COLL VENOUS BLD VENIPUNCTURE: CPT

## 2022-08-16 PROCEDURE — 80061 LIPID PANEL: CPT

## 2022-08-16 PROCEDURE — 85025 COMPLETE CBC W/AUTO DIFF WBC: CPT

## 2022-08-16 PROCEDURE — 99203 OFFICE O/P NEW LOW 30 MIN: CPT | Performed by: INTERNAL MEDICINE

## 2022-08-16 RX ORDER — HYDROXYZINE HYDROCHLORIDE 25 MG/1
25 TABLET, FILM COATED ORAL
COMMUNITY
Start: 2022-08-06 | End: 2022-11-09

## 2022-08-16 RX ORDER — CHOLECALCIFEROL (VITAMIN D3) 125 MCG
CAPSULE ORAL 2 TIMES DAILY
COMMUNITY

## 2022-08-16 RX ORDER — FAMOTIDINE 20 MG/1
20 TABLET, FILM COATED ORAL
COMMUNITY
Start: 2022-08-06 | End: 2022-11-09

## 2022-08-16 RX ORDER — MULTIVIT WITH MINERALS/LUTEIN
TABLET ORAL DAILY
COMMUNITY

## 2022-08-16 RX ORDER — CHLORAL HYDRATE 500 MG
1000 CAPSULE ORAL
COMMUNITY

## 2022-08-16 RX ORDER — HYDROXYZINE HYDROCHLORIDE 25 MG/1
TABLET, FILM COATED ORAL
COMMUNITY
Start: 2022-08-07 | End: 2022-08-16

## 2022-08-16 ASSESSMENT — ENCOUNTER SYMPTOMS
SPUTUM PRODUCTION: 0
COUGH: 0
STRIDOR: 0
FEVER: 0
CHILLS: 0
SHORTNESS OF BREATH: 0
NEUROLOGICAL NEGATIVE: 1
RESPIRATORY NEGATIVE: 1
BRUISES/BLEEDS EASILY: 0
WEAKNESS: 0
WHEEZING: 0
LOSS OF CONSCIOUSNESS: 0
ORTHOPNEA: 0
CARDIOVASCULAR NEGATIVE: 1
PND: 0
PALPITATIONS: 0
EYES NEGATIVE: 1
MUSCULOSKELETAL NEGATIVE: 1
HEMOPTYSIS: 0
GASTROINTESTINAL NEGATIVE: 1
DIZZINESS: 0
CONSTITUTIONAL NEGATIVE: 1
CLAUDICATION: 0
SORE THROAT: 0

## 2022-08-16 ASSESSMENT — FIBROSIS 4 INDEX: FIB4 SCORE: 1.722222222222222222

## 2022-08-16 NOTE — PROGRESS NOTES
Chief Complaint   Patient presents with    Abnormal Cardiac Function Testing       Subjective     Oksana Ricketts is a 61 y.o. female who presents today as a new consultation for an elevated calcium score.  She is a 61-year-old female who has had elevated cholesterol in the past.  She has no premature family history of heart disease.  As part of her work-up she was found to have elevated cholesterol level and was sent for calcium score.  Her calcium score was high risk with a score greater than 400.  She was sent for a treadmill stress test which he completed [facility] yesterday which she completed with no chest pain.  Her most recent LDL was performed this morning which is now down to 65 from 150.  She is tolerating 40 of atorvastatin with no limitations.  She is otherwise been well.  She does not smoke drink or do drugs.  She has no premature family history of heart disease.    History reviewed. No pertinent past medical history.  History reviewed. No pertinent surgical history.  Family History   Problem Relation Age of Onset    Lung Disease Mother     Colorectal Cancer Father      Social History     Socioeconomic History    Marital status: Single     Spouse name: Not on file    Number of children: Not on file    Years of education: Not on file    Highest education level: GED or equivalent   Occupational History    Not on file   Tobacco Use    Smoking status: Never    Smokeless tobacco: Never   Vaping Use    Vaping Use: Never used   Substance and Sexual Activity    Alcohol use: Yes     Comment: occ    Drug use: Never    Sexual activity: Not on file   Other Topics Concern    Not on file   Social History Narrative    Not on file     Social Determinants of Health     Financial Resource Strain: Unknown    Difficulty of Paying Living Expenses: Patient refused   Food Insecurity: Food Insecurity Present    Worried About Running Out of Food in the Last Year: Sometimes true    Ran Out of Food in the Last Year: Patient refused    Transportation Needs: Unknown    Lack of Transportation (Medical): Patient refused    Lack of Transportation (Non-Medical): Patient refused   Physical Activity: Sufficiently Active    Days of Exercise per Week: 7 days    Minutes of Exercise per Session: 60 min   Stress: Stress Concern Present    Feeling of Stress : To some extent   Social Connections: Socially Isolated    Frequency of Communication with Friends and Family: More than three times a week    Frequency of Social Gatherings with Friends and Family: More than three times a week    Attends Confucianism Services: Never    Active Member of Clubs or Organizations: No    Attends Club or Organization Meetings: Never    Marital Status: Never    Intimate Partner Violence: Not on file   Housing Stability: Unknown    Unable to Pay for Housing in the Last Year: Patient refused    Number of Places Lived in the Last Year: Not on file    Unstable Housing in the Last Year: Patient refused     Allergies   Allergen Reactions    Niacin Hives     hives     Outpatient Encounter Medications as of 8/16/2022   Medication Sig Dispense Refill    Magnesium 100 MG Cap Take  by mouth every day.      Zinc Sulfate (ZINC 15 PO) Take  by mouth every day.      Cholecalciferol (VITAMIN D) 125 MCG (5000 UT) Cap Take  by mouth 2 times a day.      Misc Natural Products (GLUCOSAMINE CHOND CMP ADVANCED PO) Take  by mouth every day.      Ascorbic Acid (VITAMIN C) 1000 MG Tab Take  by mouth every day.      Omega-3 Fatty Acids (FISH OIL) 1000 MG Cap capsule Take 1,000 mg by mouth 3 times a day with meals.      atorvastatin (LIPITOR) 40 MG Tab Take 1 Tablet by mouth every evening. 90 Tablet 3    aspirin 81 MG EC tablet Take 1 Tablet by mouth every day. 90 Tablet 3    famotidine (PEPCID) 20 MG Tab Take 20 mg by mouth. (Patient not taking: Reported on 8/16/2022)      hydrOXYzine HCl (ATARAX) 25 MG Tab Take 25 mg by mouth. (Patient not taking: Reported on 8/16/2022)      [DISCONTINUED]  "hydrOXYzine HCl (ATARAX) 25 MG Tab  (Patient not taking: Reported on 8/16/2022)      citalopram (CELEXA) 10 MG tablet Take 1 Tablet by mouth every day. (Patient not taking: Reported on 8/16/2022) 90 Tablet 3    COVID-19 mRNA Vac-Jackie,Pfizer, (PFIZER-BIONT COVID-19 VAC-JACKIE) 30 MCG/0.3ML Suspension injection Inject 0.3 mL into the shoulder, thigh, or buttocks. (Patient not taking: Reported on 8/16/2022) 0.3 mL 0     No facility-administered encounter medications on file as of 8/16/2022.     Review of Systems   Constitutional: Negative.  Negative for chills, fever and malaise/fatigue.   HENT: Negative.  Negative for sore throat.    Eyes: Negative.    Respiratory: Negative.  Negative for cough, hemoptysis, sputum production, shortness of breath, wheezing and stridor.    Cardiovascular: Negative.  Negative for chest pain, palpitations, orthopnea, claudication, leg swelling and PND.   Gastrointestinal: Negative.    Genitourinary: Negative.    Musculoskeletal: Negative.    Skin: Negative.    Neurological: Negative.  Negative for dizziness, loss of consciousness and weakness.   Endo/Heme/Allergies: Negative.  Does not bruise/bleed easily.   All other systems reviewed and are negative.           Objective     /62 (BP Location: Left arm, Patient Position: Sitting, BP Cuff Size: Small adult)   Pulse 71   Resp 18   Ht 1.765 m (5' 9.5\")   Wt 62.1 kg (137 lb)   SpO2 99%   BMI 19.94 kg/m²     Physical Exam  Vitals and nursing note reviewed.   Constitutional:       General: She is not in acute distress.     Appearance: She is well-developed. She is not diaphoretic.   HENT:      Head: Normocephalic and atraumatic.      Right Ear: External ear normal.      Left Ear: External ear normal.      Nose: Nose normal.      Mouth/Throat:      Pharynx: No oropharyngeal exudate.   Eyes:      General: No scleral icterus.        Right eye: No discharge.         Left eye: No discharge.      Conjunctiva/sclera: Conjunctivae normal.     "  Pupils: Pupils are equal, round, and reactive to light.   Neck:      Vascular: No JVD.   Cardiovascular:      Rate and Rhythm: Normal rate and regular rhythm.      Heart sounds: No murmur heard.    No friction rub. No gallop.   Pulmonary:      Effort: Pulmonary effort is normal. No respiratory distress.      Breath sounds: No stridor. No wheezing or rales.   Chest:      Chest wall: No tenderness.   Abdominal:      General: There is no distension.      Palpations: Abdomen is soft.      Tenderness: There is no guarding.   Musculoskeletal:         General: No tenderness or deformity. Normal range of motion.      Cervical back: Neck supple.   Skin:     General: Skin is warm and dry.      Coloration: Skin is not pale.      Findings: No erythema or rash.   Neurological:      Mental Status: She is alert.      Cranial Nerves: No cranial nerve deficit.      Motor: No abnormal muscle tone.      Coordination: Coordination normal.      Deep Tendon Reflexes: Reflexes are normal and symmetric. Reflexes normal.   Psychiatric:         Behavior: Behavior normal.         Thought Content: Thought content normal.         Judgment: Judgment normal.              Assessment & Plan     1. Hyperlipidemia, unspecified hyperlipidemia type        2. H/O bilateral breast implants  Lipoprotein (a)    Comp Metabolic Panel    Lipid Profile    HOMOCYSTEINE    CANCELED: proBrain Natriuretic Peptide, NT      3. Coronary artery disease involving native coronary artery of native heart without angina pectoris  Lipoprotein (a)    Comp Metabolic Panel    Lipid Profile    HOMOCYSTEINE    CANCELED: Comp Metabolic Panel    CANCELED: proBrain Natriuretic Peptide, NT      4. Cardiomegaly  Lipoprotein (a)    Comp Metabolic Panel    Lipid Profile    HOMOCYSTEINE    CANCELED: Comp Metabolic Panel    CANCELED: proBrain Natriuretic Peptide, NT          Medical Decision Making: Today's Assessment/Status/Plan:        61-year-old female with hyperlipidemia and a high  risk calcium score.  I will add on a lipoprotein a level homocysteine level and a repeat lipid in 3 months.  She will stay on the atorvastatin.  I will see her back in 3 months.

## 2022-08-17 ENCOUNTER — APPOINTMENT (OUTPATIENT)
Dept: LAB | Facility: MEDICAL CENTER | Age: 62
End: 2022-08-17
Attending: INTERNAL MEDICINE
Payer: MEDICAID

## 2022-08-17 ENCOUNTER — HOSPITAL ENCOUNTER (OUTPATIENT)
Dept: HEMATOLOGY ONCOLOGY | Facility: MEDICAL CENTER | Age: 62
End: 2022-08-17
Attending: INTERNAL MEDICINE
Payer: MEDICAID

## 2022-08-17 VITALS
DIASTOLIC BLOOD PRESSURE: 72 MMHG | OXYGEN SATURATION: 93 % | SYSTOLIC BLOOD PRESSURE: 120 MMHG | TEMPERATURE: 97.9 F | HEART RATE: 72 BPM | BODY MASS INDEX: 20.23 KG/M2 | RESPIRATION RATE: 16 BRPM | HEIGHT: 69 IN | WEIGHT: 136.57 LBS

## 2022-08-17 DIAGNOSIS — D70.9 NEUTROPENIA, UNSPECIFIED TYPE (HCC): ICD-10-CM

## 2022-08-17 DIAGNOSIS — Z12.11 COLON CANCER SCREENING: ICD-10-CM

## 2022-08-17 PROCEDURE — 99212 OFFICE O/P EST SF 10 MIN: CPT

## 2022-08-17 PROCEDURE — 99203 OFFICE O/P NEW LOW 30 MIN: CPT | Performed by: INTERNAL MEDICINE

## 2022-08-17 ASSESSMENT — PAIN SCALES - GENERAL: PAINLEVEL: NO PAIN

## 2022-08-17 ASSESSMENT — FIBROSIS 4 INDEX: FIB4 SCORE: 1.722222222222222222

## 2022-08-18 ENCOUNTER — HOSPITAL ENCOUNTER (OUTPATIENT)
Dept: RADIOLOGY | Facility: MEDICAL CENTER | Age: 62
End: 2022-08-18
Attending: FAMILY MEDICINE
Payer: MEDICAID

## 2022-08-18 ENCOUNTER — HOSPITAL ENCOUNTER (OUTPATIENT)
Dept: LAB | Facility: MEDICAL CENTER | Age: 62
End: 2022-08-18
Attending: INTERNAL MEDICINE
Payer: MEDICAID

## 2022-08-18 DIAGNOSIS — D70.9 NEUTROPENIA, UNSPECIFIED TYPE (HCC): ICD-10-CM

## 2022-08-18 DIAGNOSIS — G89.29 CHRONIC PAIN OF LEFT KNEE: ICD-10-CM

## 2022-08-18 DIAGNOSIS — M25.562 CHRONIC PAIN OF LEFT KNEE: ICD-10-CM

## 2022-08-18 LAB
FOLATE SERPL-MCNC: 19.7 NG/ML
VIT B12 SERPL-MCNC: 504 PG/ML (ref 211–911)

## 2022-08-18 PROCEDURE — 82746 ASSAY OF FOLIC ACID SERUM: CPT

## 2022-08-18 PROCEDURE — 36415 COLL VENOUS BLD VENIPUNCTURE: CPT

## 2022-08-18 PROCEDURE — 86038 ANTINUCLEAR ANTIBODIES: CPT

## 2022-08-18 PROCEDURE — 73721 MRI JNT OF LWR EXTRE W/O DYE: CPT | Mod: LT

## 2022-08-18 PROCEDURE — 82607 VITAMIN B-12: CPT

## 2022-08-19 LAB — NUCLEAR IGG SER QL IA: NORMAL

## 2022-09-07 RX ORDER — ATORVASTATIN CALCIUM 40 MG/1
40 TABLET, FILM COATED ORAL NIGHTLY
Qty: 90 TABLET | Refills: 3 | Status: SHIPPED | OUTPATIENT
Start: 2022-09-07 | End: 2022-09-07 | Stop reason: SDUPTHER

## 2022-09-07 RX ORDER — ATORVASTATIN CALCIUM 40 MG/1
40 TABLET, FILM COATED ORAL NIGHTLY
Qty: 90 TABLET | Refills: 3 | Status: SHIPPED | OUTPATIENT
Start: 2022-09-07 | End: 2022-10-06 | Stop reason: SDUPTHER

## 2022-09-19 ENCOUNTER — APPOINTMENT (OUTPATIENT)
Dept: RADIOLOGY | Facility: OTHER | Age: 62
End: 2022-09-19
Attending: STUDENT IN AN ORGANIZED HEALTH CARE EDUCATION/TRAINING PROGRAM
Payer: MEDICAID

## 2022-09-19 ENCOUNTER — OFFICE VISIT (OUTPATIENT)
Dept: MEDICAL GROUP | Facility: OTHER | Age: 62
End: 2022-09-19
Payer: MEDICAID

## 2022-09-19 VITALS
DIASTOLIC BLOOD PRESSURE: 62 MMHG | HEIGHT: 70 IN | BODY MASS INDEX: 19.61 KG/M2 | SYSTOLIC BLOOD PRESSURE: 102 MMHG | HEART RATE: 65 BPM | TEMPERATURE: 98.4 F | OXYGEN SATURATION: 93 % | WEIGHT: 137 LBS

## 2022-09-19 DIAGNOSIS — G89.29 CHRONIC PAIN OF LEFT ANKLE: ICD-10-CM

## 2022-09-19 DIAGNOSIS — M25.562 CHRONIC PAIN OF LEFT KNEE: ICD-10-CM

## 2022-09-19 DIAGNOSIS — S93.324S LISFRANC DISLOCATION, RIGHT, SEQUELA: ICD-10-CM

## 2022-09-19 DIAGNOSIS — M25.572 CHRONIC PAIN OF LEFT ANKLE: ICD-10-CM

## 2022-09-19 DIAGNOSIS — G89.29 CHRONIC PAIN OF LEFT KNEE: ICD-10-CM

## 2022-09-19 PROCEDURE — 99213 OFFICE O/P EST LOW 20 MIN: CPT | Mod: GC | Performed by: STUDENT IN AN ORGANIZED HEALTH CARE EDUCATION/TRAINING PROGRAM

## 2022-09-19 PROCEDURE — 73610 X-RAY EXAM OF ANKLE: CPT | Mod: TC,FY,LT | Performed by: FAMILY MEDICINE

## 2022-09-19 PROCEDURE — 73630 X-RAY EXAM OF FOOT: CPT | Mod: TC,FY,RT | Performed by: FAMILY MEDICINE

## 2022-09-19 RX ORDER — VIT C/B6/B5/MAGNESIUM/HERB 173 50-5-6-5MG
CAPSULE ORAL
COMMUNITY

## 2022-09-19 ASSESSMENT — FIBROSIS 4 INDEX: FIB4 SCORE: 1.722222222222222222

## 2022-09-19 NOTE — ASSESSMENT & PLAN NOTE
At this point, the patient's knee does function pretty well and pain is relatively intermittent.  It is likely that a lot of her knee pain is secondary to gait abnormalities related to her history of Lisfranc fracture to the right foot and subsequent osteoarthritis/nonsurgical fusion of her midfoot.  Therefore, we have sent her to podiatry today.  She is encouraged to continue with her regular activities, which include racquetball and Pilates.  She may return to clinic at any point if her knee pain is worsening or if she has any other concerns.
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
## 2022-09-19 NOTE — ASSESSMENT & PLAN NOTE
X-rays today in office show substantial arthritis to the midfoot with loss of essentially all joint spaces in this region.  There is a soft tissue lump to the dorsum of the foot on imaging and on exam.  Due to the patient's substantial arthritis and altered foot morphology, we have recommended podiatry to her today for consideration of orthotics to support her foot and help her improve her gait.  If this does not work, she is to follow in clinic with us and we can consider alternative therapies including referral to surgery.

## 2022-09-19 NOTE — ASSESSMENT & PLAN NOTE
X-rays of the left ankle are essentially within normal limits.  There is mild arthritis to the midfoot and there is some diffuse soft tissue edema to the dorsum of the foot.  As for her right foot pain, we have referred her to podiatry today for consideration of orthotics.  If this does not help, she is welcome to return to clinic at any time to consider other treatment modalities.

## 2022-09-25 LAB
APOB+LDLR+PCSK9 GENE MUT ANL BLD/T: NOT DETECTED
BRCA1+BRCA2 DEL+DUP + FULL MUT ANL BLD/T: NOT DETECTED
MLH1+MSH2+MSH6+PMS2 GN DEL+DUP+FUL M: NOT DETECTED

## 2022-10-05 ENCOUNTER — PATIENT MESSAGE (OUTPATIENT)
Dept: MEDICAL GROUP | Facility: CLINIC | Age: 62
End: 2022-10-05
Payer: MEDICAID

## 2022-10-06 RX ORDER — ATORVASTATIN CALCIUM 40 MG/1
40 TABLET, FILM COATED ORAL NIGHTLY
Qty: 90 TABLET | Refills: 3 | Status: CANCELLED | OUTPATIENT
Start: 2022-10-06

## 2022-10-07 ENCOUNTER — TELEPHONE (OUTPATIENT)
Dept: HOSPITALIST | Facility: MEDICAL CENTER | Age: 62
End: 2022-10-07
Payer: MEDICAID

## 2022-10-08 RX ORDER — ASPIRIN 81 MG/1
81 TABLET ORAL DAILY
Qty: 90 TABLET | Refills: 3 | Status: SHIPPED | OUTPATIENT
Start: 2022-10-08 | End: 2022-10-17 | Stop reason: SDUPTHER

## 2022-10-08 RX ORDER — ATORVASTATIN CALCIUM 40 MG/1
40 TABLET, FILM COATED ORAL NIGHTLY
Qty: 90 TABLET | Refills: 3 | Status: SHIPPED | OUTPATIENT
Start: 2022-10-08 | End: 2022-10-17 | Stop reason: SDUPTHER

## 2022-10-13 ENCOUNTER — RESEARCH ENCOUNTER (OUTPATIENT)
Dept: LAB | Facility: MEDICAL CENTER | Age: 62
End: 2022-10-13

## 2022-10-17 RX ORDER — ASPIRIN 81 MG/1
81 TABLET ORAL DAILY
Qty: 90 TABLET | Refills: 3 | Status: SHIPPED | OUTPATIENT
Start: 2022-10-17 | End: 2023-02-23 | Stop reason: SDUPTHER

## 2022-10-17 RX ORDER — ATORVASTATIN CALCIUM 40 MG/1
40 TABLET, FILM COATED ORAL NIGHTLY
Qty: 90 TABLET | Refills: 3 | Status: SHIPPED | OUTPATIENT
Start: 2022-10-17 | End: 2023-02-23 | Stop reason: SDUPTHER

## 2022-11-03 ENCOUNTER — PATIENT MESSAGE (OUTPATIENT)
Dept: CARDIOLOGY | Facility: MEDICAL CENTER | Age: 62
End: 2022-11-03
Payer: MEDICAID

## 2022-11-07 ENCOUNTER — TELEPHONE (OUTPATIENT)
Dept: CARDIOLOGY | Facility: MEDICAL CENTER | Age: 62
End: 2022-11-07

## 2022-11-07 NOTE — TELEPHONE ENCOUNTER
TAMMIE    Caller: - Oksana  (Please see [employer] Message)    Topic/issue: Oksana has appt on 11/9 with TAM[facility]E, she is in [place], and wanted to do labs there, then do a Virtual appt.  Please advise. We show [facility] cannot do virtual appts    Callback Number: [phone]    Thank you,   Sridevi MCGUIRE

## 2022-11-07 NOTE — PATIENT COMMUNICATION
TAMMIE     Caller: - Oksana  (Please see [employer] Message)     Topic/issue: kOsana has appt on 11/9 with TAMMIE, she is in [place], and wanted to do labs there, then do a Virtual appt.  Please advise. We show [facility] cannot do virtual appts     Callback Number: [phone]     Thank you,   Sridevi MCGUIRE

## 2022-11-09 ENCOUNTER — TELEMEDICINE (OUTPATIENT)
Dept: CARDIOLOGY | Facility: MEDICAL CENTER | Age: 62
End: 2022-11-09
Payer: MEDICAID

## 2022-11-09 DIAGNOSIS — Z80.0 FAMILY HISTORY OF COLON CANCER: ICD-10-CM

## 2022-11-09 DIAGNOSIS — E78.5 HYPERLIPIDEMIA, UNSPECIFIED HYPERLIPIDEMIA TYPE: ICD-10-CM

## 2022-11-09 DIAGNOSIS — I51.7 CARDIOMEGALY: ICD-10-CM

## 2022-11-09 DIAGNOSIS — I25.10 CORONARY ARTERY DISEASE INVOLVING NATIVE CORONARY ARTERY OF NATIVE HEART WITHOUT ANGINA PECTORIS: ICD-10-CM

## 2022-11-09 PROCEDURE — 99214 OFFICE O/P EST MOD 30 MIN: CPT | Performed by: INTERNAL MEDICINE

## 2022-11-09 ASSESSMENT — ENCOUNTER SYMPTOMS
MUSCULOSKELETAL NEGATIVE: 1
SORE THROAT: 0
NEUROLOGICAL NEGATIVE: 1
RESPIRATORY NEGATIVE: 1
CLAUDICATION: 0
EYES NEGATIVE: 1
COUGH: 0
SHORTNESS OF BREATH: 0
STRIDOR: 0
PND: 0
ORTHOPNEA: 0
SPUTUM PRODUCTION: 0
WEAKNESS: 0
CHILLS: 0
CARDIOVASCULAR NEGATIVE: 1
DIZZINESS: 0
FEVER: 0
LOSS OF CONSCIOUSNESS: 0
CONSTITUTIONAL NEGATIVE: 1
BRUISES/BLEEDS EASILY: 0
WHEEZING: 0
PALPITATIONS: 0
HEMOPTYSIS: 0
GASTROINTESTINAL NEGATIVE: 1

## 2022-11-09 NOTE — PROGRESS NOTES
Chief Complaint   Patient presents with    Hyperlipidemia       Subjective     Oksana Ricketts is a 61 y.o. female who presents today as a hot follow-up for her high cholesterol and high calcium score.  She had a score greater than 400.  She completed a treadmill test with no issues.  Her most recent LDL was 65 she had her labs done but they are not available today.  She is doing well on atorvastatin to 40.  Her LPA levels and homocysteine levels are also not back yet.    History reviewed. No pertinent past medical history.  History reviewed. No pertinent surgical history.  Family History   Problem Relation Age of Onset    Lung Disease Mother     Colorectal Cancer Father      Social History     Socioeconomic History    Marital status: Single     Spouse name: Not on file    Number of children: Not on file    Years of education: Not on file    Highest education level: GED or equivalent   Occupational History    Not on file   Tobacco Use    Smoking status: Never    Smokeless tobacco: Never   Vaping Use    Vaping Use: Never used   Substance and Sexual Activity    Alcohol use: Yes     Comment: occ    Drug use: Never    Sexual activity: Not on file   Other Topics Concern    Not on file   Social History Narrative    Not on file     Social Determinants of Health     Financial Resource Strain: Unknown    Difficulty of Paying Living Expenses: Patient refused   Food Insecurity: Food Insecurity Present    Worried About Running Out of Food in the Last Year: Sometimes true    Ran Out of Food in the Last Year: Patient refused   Transportation Needs: Unknown    Lack of Transportation (Medical): Patient refused    Lack of Transportation (Non-Medical): Patient refused   Physical Activity: Sufficiently Active    Days of Exercise per Week: 7 days    Minutes of Exercise per Session: 60 min   Stress: Stress Concern Present    Feeling of Stress : To some extent   Social Connections: Socially Isolated    Frequency of Communication with Friends  and Family: More than three times a week    Frequency of Social Gatherings with Friends and Family: More than three times a week    Attends Buddhism Services: Never    Active Member of Clubs or Organizations: No    Attends Club or Organization Meetings: Never    Marital Status: Never    Intimate Partner Violence: Not on file   Housing Stability: Unknown    Unable to Pay for Housing in the Last Year: Patient refused    Number of Places Lived in the Last Year: Not on file    Unstable Housing in the Last Year: Patient refused     Allergies   Allergen Reactions    Niacin Hives     hives     Outpatient Encounter Medications as of 11/9/2022   Medication Sig Dispense Refill    Cyanocobalamin (VITAMIN B-12 PO) Take 1 Tablet by mouth every day.      Coenzyme Q10 (COQ10 PO) Take 1 Tablet by mouth every day. TWICE A DAY EVERY 48 HOURS      atorvastatin (LIPITOR) 40 MG Tab Take 1 Tablet by mouth every evening. 90 Tablet 3    aspirin 81 MG EC tablet Take 1 Tablet by mouth every day. 90 Tablet 3    Turmeric 500 MG Cap Take  by mouth.      Magnesium 100 MG Cap Take  by mouth every day.      Zinc Sulfate (ZINC 15 PO) Take  by mouth every day.      Cholecalciferol (VITAMIN D) 125 MCG (5000 UT) Cap Take  by mouth 2 times a day.      Misc Natural Products (GLUCOSAMINE CHOND CMP ADVANCED PO) Take  by mouth every day.      Ascorbic Acid (VITAMIN C) 1000 MG Tab Take  by mouth every day.      Omega-3 Fatty Acids (FISH OIL) 1000 MG Cap capsule Take 1 Capsule by mouth 3 times a day with meals.      [DISCONTINUED] famotidine (PEPCID) 20 MG Tab Take 20 mg by mouth. (Patient not taking: No sig reported)      [DISCONTINUED] hydrOXYzine HCl (ATARAX) 25 MG Tab Take 25 mg by mouth. (Patient not taking: No sig reported)      [DISCONTINUED] citalopram (CELEXA) 10 MG tablet Take 1 Tablet by mouth every day. (Patient not taking: Reported on 8/16/2022) 90 Tablet 3    [DISCONTINUED] COVID-19 mRNA Vac-Jackie,Pf[employer], (PFIZER-BIONT COVID-19 VAC-JACKIE)  30 MCG/0.3ML Suspension injection Inject 0.3 mL into the shoulder, thigh, or buttocks. (Patient not taking: Reported on 8/16/2022) 0.3 mL 0     No facility-administered encounter medications on file as of 11/9/2022.     Review of Systems   Constitutional: Negative.  Negative for chills, fever and malaise/fatigue.   HENT: Negative.  Negative for sore throat.    Eyes: Negative.    Respiratory: Negative.  Negative for cough, hemoptysis, sputum production, shortness of breath, wheezing and stridor.    Cardiovascular: Negative.  Negative for chest pain, palpitations, orthopnea, claudication, leg swelling and PND.   Gastrointestinal: Negative.    Genitourinary: Negative.    Musculoskeletal: Negative.    Skin: Negative.    Neurological: Negative.  Negative for dizziness, loss of consciousness and weakness.   Endo/Heme/Allergies: Negative.  Does not bruise/bleed easily.   All other systems reviewed and are negative.           Objective     There were no vitals taken for this visit.    Physical Exam  Vitals and nursing note reviewed.   Constitutional:       General: She is not in acute distress.     Appearance: She is well-developed. She is not diaphoretic.   HENT:      Head: Normocephalic and atraumatic.      Right Ear: External ear normal.      Left Ear: External ear normal.      Nose: Nose normal.      Mouth/Throat:      Pharynx: No oropharyngeal exudate.   Eyes:      General: No scleral icterus.        Right eye: No discharge.         Left eye: No discharge.      Conjunctiva/sclera: Conjunctivae normal.      Pupils: Pupils are equal, round, and reactive to light.   Neck:      Vascular: No JVD.   Cardiovascular:      Rate and Rhythm: Normal rate and regular rhythm.      Heart sounds: No murmur heard.    No friction rub. No gallop.   Pulmonary:      Effort: Pulmonary effort is normal. No respiratory distress.      Breath sounds: No stridor. No wheezing or rales.   Chest:      Chest wall: No tenderness.   Abdominal:       General: There is no distension.      Palpations: Abdomen is soft.      Tenderness: There is no guarding.   Musculoskeletal:         General: No tenderness or deformity. Normal range of motion.      Cervical back: Neck supple.   Skin:     General: Skin is warm and dry.      Coloration: Skin is not pale.      Findings: No erythema or rash.   Neurological:      Mental Status: She is alert.      Cranial Nerves: No cranial nerve deficit.      Motor: No abnormal muscle tone.      Coordination: Coordination normal.      Deep Tendon Reflexes: Reflexes are normal and symmetric. Reflexes normal.   Psychiatric:         Behavior: Behavior normal.         Thought Content: Thought content normal.         Judgment: Judgment normal.              Assessment & Plan     1. Family history of colon cancer        2. Hyperlipidemia, unspecified hyperlipidemia type        3. Coronary artery disease involving native coronary artery of native heart without angina pectoris        4. Cardiomegaly            Medical Decision Making: Today's Assessment/Status/Plan:     59-year-old female with high cholesterol and an elevated calcium score greater than 400.  We will keep her on atorvastatin.  I will await her lab results.  If her LDL is any higher we will discuss adding on Zetia.  We will also look at her LPA and her homocysteine levels.  I will see her back in 6 months.    Start time: 3:30  Stop time: 3:45     This evaluation was conducted via Zoom using secure and encrypted videoconferencing technology. The patient was in their home in the [facility].    The patient's identity was confirmed and verbal consent was obtained for this virtual visit.

## 2022-11-15 ENCOUNTER — TELEPHONE (OUTPATIENT)
Dept: CARDIOLOGY | Facility: MEDICAL CENTER | Age: 62
End: 2022-11-15
Payer: MEDICAID

## 2022-11-15 NOTE — TELEPHONE ENCOUNTER
Per patient request  contact [facility] to fax recent labs to our office.     Phone Number : [phone]

## 2022-12-13 DIAGNOSIS — M54.16 LUMBAR RADICULOPATHY: ICD-10-CM

## 2022-12-20 DIAGNOSIS — I51.7 CARDIOMEGALY: ICD-10-CM

## 2022-12-20 DIAGNOSIS — Z98.82 H/O BILATERAL BREAST IMPLANTS: ICD-10-CM

## 2022-12-20 DIAGNOSIS — I25.10 CORONARY ARTERY DISEASE INVOLVING NATIVE CORONARY ARTERY OF NATIVE HEART WITHOUT ANGINA PECTORIS: ICD-10-CM

## 2023-01-19 ENCOUNTER — APPOINTMENT (OUTPATIENT)
Dept: MEDICAL GROUP | Facility: CLINIC | Age: 63
End: 2023-01-19
Payer: MEDICAID

## 2023-02-02 ENCOUNTER — OFFICE VISIT (OUTPATIENT)
Dept: MEDICAL GROUP | Facility: CLINIC | Age: 63
End: 2023-02-02
Payer: MEDICAID

## 2023-02-02 VITALS — WEIGHT: 143 LBS | HEIGHT: 70 IN | RESPIRATION RATE: 16 BRPM | BODY MASS INDEX: 20.47 KG/M2

## 2023-02-02 DIAGNOSIS — S32.000S COMPRESSION FRACTURE OF LUMBAR VERTEBRA, UNSPECIFIED LUMBAR VERTEBRAL LEVEL, SEQUELA: ICD-10-CM

## 2023-02-02 DIAGNOSIS — G89.29 CHRONIC BACK PAIN, UNSPECIFIED BACK LOCATION, UNSPECIFIED BACK PAIN LATERALITY: ICD-10-CM

## 2023-02-02 DIAGNOSIS — M54.9 CHRONIC BACK PAIN, UNSPECIFIED BACK LOCATION, UNSPECIFIED BACK PAIN LATERALITY: ICD-10-CM

## 2023-02-02 DIAGNOSIS — M25.562 CHRONIC PAIN OF LEFT KNEE: ICD-10-CM

## 2023-02-02 DIAGNOSIS — S93.324S LISFRANC DISLOCATION, RIGHT, SEQUELA: ICD-10-CM

## 2023-02-02 DIAGNOSIS — G89.29 CHRONIC PAIN OF LEFT KNEE: ICD-10-CM

## 2023-02-02 DIAGNOSIS — M84.40XA INSUFFICIENCY FRACTURE: ICD-10-CM

## 2023-02-02 PROCEDURE — 99213 OFFICE O/P EST LOW 20 MIN: CPT | Mod: GE | Performed by: STUDENT IN AN ORGANIZED HEALTH CARE EDUCATION/TRAINING PROGRAM

## 2023-02-02 ASSESSMENT — FIBROSIS 4 INDEX: FIB4 SCORE: 1.75

## 2023-02-02 ASSESSMENT — PATIENT HEALTH QUESTIONNAIRE - PHQ9: CLINICAL INTERPRETATION OF PHQ2 SCORE: 0

## 2023-02-02 NOTE — PROGRESS NOTES
"Subjective:     CC: Knee pain    HPI:   Elsa presents today with the above chief complaint.    Problem   Chronic Pain of Left Knee    Ms. Ricketts returns to clinic to discuss left knee pain. Reports that she has had a global knee pain for years.  That from time to time it gets swollen and painful to even move.  Her active flexion is limited to 90 degrees.  She reports that in June 2020 she had a fall while at work where she fell from a height of approximately 2-3 stairs, fracturing her L1 vertebra and getting a concussion.  She believes her knee pain started around that time.     She has occasional giving way but denies any locking or catching.  Did have an MRI done August 2022 with the following results.    MR results:    \"1.  Severe patellofemoral osteoarthritis with lateral patellar subluxation, patella rivka and bone-on-bone articulation     2.  Moderate femorotibial osteoarthritis with marrow edema in the lateral compartment that is moderate severity     3.  Lateral meniscus free edge fraying     4.  1 cm and smaller nonossified loose bodies     5.  Mild lateral tibial subluxation\"    Since that time, denies any new injuries.  She reports that she had done physical therapy previously for her back injury and for neck pain but has not did not for her knees, aside from home stretching and exercises.  She has not had injections to the knee.  She reports that her back pain continues to be intermittent, as well, and she formerly saw Dr. Jackson for this.         Current Outpatient Medications Ordered in Epic   Medication Sig Dispense Refill    Cyanocobalamin (VITAMIN B-12 PO) Take 1 Tablet by mouth every day.      Coenzyme Q10 (COQ10 PO) Take 1 Tablet by mouth every day. TWICE A DAY EVERY 48 HOURS      atorvastatin (LIPITOR) 40 MG Tab Take 1 Tablet by mouth every evening. 90 Tablet 3    aspirin 81 MG EC tablet Take 1 Tablet by mouth every day. 90 Tablet 3    Turmeric 500 MG Cap Take  by mouth.      Magnesium 100 MG " "Cap Take  by mouth every day.      Zinc Sulfate (ZINC 15 PO) Take  by mouth every day.      Cholecalciferol (VITAMIN D) 125 MCG (5000 UT) Cap Take  by mouth 2 times a day.      Misc Natural Products (GLUCOSAMINE CHOND CMP ADVANCED PO) Take  by mouth every day.      Ascorbic Acid (VITAMIN C) 1000 MG Tab Take  by mouth every day.      Omega-3 Fatty Acids (FISH OIL) 1000 MG Cap capsule Take 1 Capsule by mouth 3 times a day with meals.       No current Epic-ordered facility-administered medications on file.       Health Maintenance: Deferred to primary care physician        Objective:     Exam:  BP (P) 96/66 (BP Location: Right arm, Patient Position: Sitting, BP Cuff Size: Adult)   Pulse (P) 70   Temp (P) 36.4 °C (97.6 °F) (Temporal)   Resp 16   Ht 1.778 m (5' 10\")   Wt 64.9 kg (143 lb)   SpO2 (P) 95%   BMI 20.52 kg/m²  Body mass index is 20.52 kg/m².    Gen: Alert and oriented, No apparent distress.  Neck: Neck is supple without lymphadenopathy.  Lungs: Normal effort, CTA bilaterally, no wheezes, rhonchi, or rales  CV: Regular rate and rhythm. No murmurs, rubs, or gallops.  Ext: No clubbing, cyanosis, edema.  AROM 0 to 90 degrees of flexion, PROM 0 to 125 degrees of flexion  No effusion  Normal alignment. No sag sign.  Mild diffuse muscle atrophy similar to other knee  Normal strength  Negative impingement test  Normal flexibility  Mild IP tenderness  Positive grind.  No tenderness to the patellar or quad tendon  No MPFL tenderness  No plica  Tenderness palpation of the medial and lateral anterior joint line  Normal ligament exam- Lachmann/ posterior drawer/ varus/valgus.  No bursal tenderness/swelling.  Normal sensation to light touch          Assessment & Plan:     62 y.o. female with the following -     Problem List Items Addressed This Visit       Chronic pain of left knee     The patient has a history of lumbar spine compression fractures with ongoing back pain, left knee pain with arthritic changes on MRI " and x-ray, history of old Lisfranc injury with obliteration of her midfoot.  For all of these reasons, I feel that she would benefit greatly from physical therapy for strengthening, mobility.  If after this, she still has residual pain, we can address these individual issues.  Options include injections or surgery.  I did recommend she also follow-up with Dr. Jackson to discuss his interpretation of her back pain and prior imaging (as I am unable to see original imaging studies).  I did also order a DEXA scan, though the patient is only 62, her BMI is 20 and she sustained lumbar compression fracture from a mild to moderate trauma.         Relevant Orders    Referral to Physical Therapy    Lisfranc dislocation, right, sequela    Relevant Orders    Referral to Physical Therapy     Other Visit Diagnoses       Chronic back pain, unspecified back location, unspecified back pain laterality        Relevant Orders    Referral to Physical Therapy    Compression fracture of lumbar vertebra, unspecified lumbar vertebral level, sequela        Relevant Orders    DS-BONE DENSITY STUDY (DEXA)    Insufficiency fracture        Relevant Orders    DS-BONE DENSITY STUDY (DEXA)                No follow-ups on file.  Would recommend follow-up in 2 to 3 months but patient moving to [place] next month.  Encouraged her to get as much treatment done in [place] as possible before she moves.

## 2023-02-02 NOTE — ASSESSMENT & PLAN NOTE
The patient has a history of lumbar spine compression fractures with ongoing back pain, left knee pain with arthritic changes on MRI and x-ray, history of old Lisfranc injury with obliteration of her midfoot.  For all of these reasons, I feel that she would benefit greatly from physical therapy for strengthening, mobility.  If after this, she still has residual pain, we can address these individual issues.  Options include injections or surgery.  I did recommend she also follow-up with Dr. Jackson to discuss his interpretation of her back pain and prior imaging (as I am unable to see original imaging studies).  I did also order a DEXA scan, though the patient is only 62, her BMI is 20 and she sustained lumbar compression fracture from a mild to moderate trauma.

## 2023-02-24 RX ORDER — ATORVASTATIN CALCIUM 40 MG/1
40 TABLET, FILM COATED ORAL NIGHTLY
Qty: 90 TABLET | Refills: 3 | Status: SHIPPED | OUTPATIENT
Start: 2023-02-24

## 2023-02-24 RX ORDER — ASPIRIN 81 MG/1
81 TABLET ORAL DAILY
Qty: 90 TABLET | Refills: 3 | Status: SHIPPED | OUTPATIENT
Start: 2023-02-24

## 2023-09-05 DIAGNOSIS — S09.90XS TRAUMATIC INJURY OF HEAD, SEQUELA: ICD-10-CM

## 2023-09-27 DIAGNOSIS — I25.10 CORONARY ARTERY DISEASE INVOLVING NATIVE CORONARY ARTERY OF NATIVE HEART WITHOUT ANGINA PECTORIS: ICD-10-CM

## 2023-09-29 DIAGNOSIS — I25.10 CORONARY ARTERY DISEASE INVOLVING NATIVE CORONARY ARTERY OF NATIVE HEART WITHOUT ANGINA PECTORIS: ICD-10-CM

## 2023-10-06 ENCOUNTER — HOSPITAL ENCOUNTER (OUTPATIENT)
Dept: RADIOLOGY | Facility: MEDICAL CENTER | Age: 63
End: 2023-10-06
Attending: FAMILY MEDICINE
Payer: COMMERCIAL

## 2023-10-06 DIAGNOSIS — I25.10 CORONARY ARTERY DISEASE INVOLVING NATIVE CORONARY ARTERY OF NATIVE HEART WITHOUT ANGINA PECTORIS: ICD-10-CM

## 2023-10-06 PROCEDURE — 4410556 CT-CARDIAC SCORING (SELF PAY ONLY)

## 2024-05-06 ENCOUNTER — PATIENT MESSAGE (OUTPATIENT)
Dept: HEALTH INFORMATION MANAGEMENT | Facility: OTHER | Age: 64
End: 2024-05-06